# Patient Record
Sex: FEMALE | Race: WHITE | NOT HISPANIC OR LATINO | Employment: FULL TIME | URBAN - METROPOLITAN AREA
[De-identification: names, ages, dates, MRNs, and addresses within clinical notes are randomized per-mention and may not be internally consistent; named-entity substitution may affect disease eponyms.]

---

## 2024-05-07 ENCOUNTER — TELEPHONE (OUTPATIENT)
Dept: HEMATOLOGY ONCOLOGY | Facility: CLINIC | Age: 39
End: 2024-05-07

## 2024-05-07 NOTE — TELEPHONE ENCOUNTER
Patient Call    Who are you speaking with? Zaynab     If it is not the patient, are they listed on an active communication consent form? N/A   What is the reason for this call? Zaynab was calling to make sure that we received the patient's records.     Does this require a call back? N/A   If a call back is required, please list best call back number na   If a call back is required, advise that a message will be forwarded to their care team and someone will return their call as soon as possible.   Did you relay this information to the patient? N/A

## 2024-05-08 ENCOUNTER — CONSULT (OUTPATIENT)
Dept: GYNECOLOGIC ONCOLOGY | Facility: CLINIC | Age: 39
End: 2024-05-08
Payer: COMMERCIAL

## 2024-05-08 ENCOUNTER — TELEPHONE (OUTPATIENT)
Dept: GYNECOLOGIC ONCOLOGY | Facility: CLINIC | Age: 39
End: 2024-05-08

## 2024-05-08 VITALS
DIASTOLIC BLOOD PRESSURE: 74 MMHG | HEART RATE: 90 BPM | OXYGEN SATURATION: 98 % | WEIGHT: 162 LBS | TEMPERATURE: 98.5 F | BODY MASS INDEX: 29.81 KG/M2 | RESPIRATION RATE: 18 BRPM | HEIGHT: 62 IN | SYSTOLIC BLOOD PRESSURE: 124 MMHG

## 2024-05-08 DIAGNOSIS — N80.9 ENDOMETRIOSIS: Primary | ICD-10-CM

## 2024-05-08 DIAGNOSIS — N83.201 BILATERAL OVARIAN CYSTS: ICD-10-CM

## 2024-05-08 DIAGNOSIS — N83.202 BILATERAL OVARIAN CYSTS: ICD-10-CM

## 2024-05-08 PROCEDURE — 99245 OFF/OP CONSLTJ NEW/EST HI 55: CPT | Performed by: OBSTETRICS & GYNECOLOGY

## 2024-05-08 RX ORDER — MELATONIN
1000 DAILY
COMMUNITY

## 2024-05-08 RX ORDER — PHENTERMINE HYDROCHLORIDE 37.5 MG/1
37.5 TABLET ORAL DAILY
COMMUNITY
Start: 2024-03-22

## 2024-05-08 RX ORDER — PENTOSAN POLYSULFATE SODIUM 100 MG/1
100 CAPSULE, GELATIN COATED ORAL
COMMUNITY
Start: 2024-03-18

## 2024-05-08 RX ORDER — OXYCODONE HYDROCHLORIDE AND ACETAMINOPHEN 5; 325 MG/1; MG/1
1 TABLET ORAL
COMMUNITY
Start: 2024-04-28

## 2024-05-08 RX ORDER — ENOXAPARIN SODIUM 300 MG/3ML
40 INJECTION INTRAVENOUS; SUBCUTANEOUS
OUTPATIENT
Start: 2024-05-08 | End: 2024-05-09

## 2024-05-08 RX ORDER — MULTIVITAMIN
1 CAPSULE ORAL DAILY
COMMUNITY

## 2024-05-08 RX ORDER — TOPIRAMATE 25 MG/1
25 TABLET ORAL
COMMUNITY
Start: 2024-03-28

## 2024-05-08 NOTE — TELEPHONE ENCOUNTER
Lmm for patient to return my call, regarding procedure scheduling.  Offered two dates    6/13= Dave, 6/14=Daryl.  Teams number provided ([phone]) for call back.

## 2024-05-08 NOTE — ASSESSMENT & PLAN NOTE
Patient very pleasant 39-year-old female with a recent diagnosis of stage IV endometriosis and bilateral ovarian cysts.  She is being symptomatic for this.  Attempt at laparoscopic surgery was unsuccessful.  We discussed treatment related options.  The patient would like to move ahead with surgery.  She has used oral contraceptives in the past without significant benefit.  We have discussed the likely need for post surgery hormone replacement therapy as we have recommended removal of uterus cervix tubes and ovaries given the significant pathology identified at time of recent surgery.     We have recommended the following:    Robotically assisted total laparoscopic hysterectomy bilateral salpingo-oophorectomy with possible pelvic and para-aortic lymph node dissection staging biopsies including omentectomy based on findings at frozen section..  Have discussed risks and benefits of the procedure including bleeding requiring transfusion infection, infection, damage to local structures including bowel bladder ureter and other local organs.  Additionally cystoscopy with placement of ureteral catheters is recommended to help to minimize risk of damage to the ureters we discussed the risk of deep venous thrombosis.  An open procedure may be required.  All of these complications are in the 2-4% range of likelihood.  The patient understands the risks and benefits of the procedure and has signed an informed consent.  I personally signed the consent form with her.  She does understand that further treatment including chemotherapy radiation therapy or hormones may be required based on the final postoperative pathologic diagnosis and staging.  Standard preoperative testing including type and screen is CBC CPM P chest x-ray and EKG will be ordered.  Any appropriate consultations for preoperative evaluation will also be ordered.  Overall consultation took 60 min with greater than 50% in dedicated toward discussion time.

## 2024-05-08 NOTE — PROGRESS NOTES
Assessment/Plan:    Problem List Items Addressed This Visit       Endometriosis - Primary     Patient very pleasant 39-year-old female with a recent diagnosis of stage IV endometriosis and bilateral ovarian cysts.  She is being symptomatic for this.  Attempt at laparoscopic surgery was unsuccessful.  We discussed treatment related options.  The patient would like to move ahead with surgery.  She has used oral contraceptives in the past without significant benefit.  We have discussed the likely need for post surgery hormone replacement therapy as we have recommended removal of uterus cervix tubes and ovaries given the significant pathology identified at time of recent surgery.     We have recommended the following:    Robotically assisted total laparoscopic hysterectomy bilateral salpingo-oophorectomy with possible pelvic and para-aortic lymph node dissection staging biopsies including omentectomy based on findings at frozen section..  Have discussed risks and benefits of the procedure including bleeding requiring transfusion infection, infection, damage to local structures including bowel bladder ureter and other local organs.  Additionally cystoscopy with placement of ureteral catheters is recommended to help to minimize risk of damage to the ureters we discussed the risk of deep venous thrombosis.  An open procedure may be required.  All of these complications are in the 2-4% range of likelihood.  The patient understands the risks and benefits of the procedure and has signed an informed consent.  I personally signed the consent form with her.  She does understand that further treatment including chemotherapy radiation therapy or hormones may be required based on the final postoperative pathologic diagnosis and staging.  Standard preoperative testing including type and screen is CBC CPM P chest x-ray and EKG will be ordered.  Any appropriate consultations for preoperative evaluation will also be ordered.  Overall  consultation took 60 min with greater than 50% in dedicated toward discussion time.          Relevant Orders    Case request operating room: HYSTERECTOMY LAPAROSCOPIC TOTAL (LTH) W/ ROBOTICS bilateral salpingo-oophorectomy, cystoscopy with bilateral ureteral catheters (Completed)    CBC and differential    Basic metabolic panel    Type and screen    EKG 12 lead    XR chest pa & lateral    Bilateral ovarian cysts    Relevant Orders    Case request operating room: HYSTERECTOMY LAPAROSCOPIC TOTAL (LTH) W/ ROBOTICS bilateral salpingo-oophorectomy, cystoscopy with bilateral ureteral catheters (Completed)    CBC and differential    Basic metabolic panel    Type and screen    EKG 12 lead    XR chest pa & lateral           CHIEF COMPLAINT: Stage IV endometriosis bilateral ovarian cysts        Patient ID: Loli Esquivel is a 39 y.o. female  Patient is a very pleasant 39-year-old female with a history of stage IV endometriosis seen in consultation from Dr. Kofi Daley.    Patient has a history of chronic pelvic pain and endometriosis.  Patient noticed for the past 3 months heavier.  Followed by 2 to 3 weeks of debilitating abdominal pain.  Prior to this the patient had not had any significant pelvic pain.  She     She underwent CAT scan of the abdomen and pelvis and pelvic ultrasound in April 2024.  This revealed a uterus measuring 10 x 7 x 6.5 cm.  Right ovary measured 8 x 7 x 5.6 cm with a large complex cyst with low-level echoes consistent with an endometrioma.  The left ovary measured 6 x 7 x 5.2 cm with a similar echo appearance also consistent with endometriosis.  Patient underwent diagnostic laparoscopy, cystourethroscopy drainage of large right endometrioma and intraoperative hemorrhage requiring extensive tamponade it was elected not to proceed with procedure.  Estimated blood loss was approximately 400 to 500 cc.  Findings were noted for stage IV endometriosis with obliteration of the cul-de-sac.    Patient  presents for further surgical management.    Today, the patient is doing well.  She denies significant abdominal pain, pelvic pain, nausea, vomiting, constipation, diarrhea, fevers, chills, or vaginal bleeding.  Incisions have been healing well.  The umbilical incision has been having minimal clear drainage           Review of Systems   Constitutional: Negative.    HENT: Negative.     Eyes: Negative.    Respiratory: Negative.     Cardiovascular: Negative.    Gastrointestinal: Negative.    Endocrine: Negative.    Genitourinary: Negative.    Musculoskeletal: Negative.    Skin: Negative.    Neurological: Negative.    Hematological: Negative.    Psychiatric/Behavioral: Negative.         Current Outpatient Medications   Medication Sig Dispense Refill    Elmiron 100 MG capsule Take 100 mg by mouth 3 (three) times a day before meals      cholecalciferol (VITAMIN D3) 1,000 units tablet Take 1,000 Units by mouth daily (Patient not taking: Reported on 2024)      Multiple Vitamin (multivitamin) capsule Take 1 capsule by mouth daily (Patient not taking: Reported on 2024)      oxyCODONE-acetaminophen (PERCOCET) 5-325 mg per tablet Take 1 tablet by mouth every 3 (three) hours as needed (Patient not taking: Reported on 2024)      phentermine (ADIPEX-P) 37.5 MG tablet Take 37.5 mg by mouth daily (Patient not taking: Reported on 2024)      topiramate (TOPAMAX) 25 mg tablet Take 25 mg by mouth daily at bedtime (Patient not taking: Reported on 2024)       No current facility-administered medications for this visit.       No Known Allergies    Past Medical History:   Diagnosis Date    Chronic pain in female pelvis     Dysmenorrhea     Endometriosis     Interstitial cystitis     Other ovarian cyst, left side     Pre-eclampsia     Rhabdomyolysis        Past Surgical History:   Procedure Laterality Date    DILATION AND CURETTAGE OF UTERUS  2007    MOUTH SURGERY         OB History          4    Para   3    Term  "  0       1    AB   1    Living   3         SAB   1    IAB        Ectopic        Multiple        Live Births   3                 Family History   Problem Relation Age of Onset    No Known Problems Mother     Heart attack Father     Other Brother         Sepsis       The following portions of the patient's history were reviewed and updated as appropriate: allergies, current medications, past family history, past medical history, past social history, past surgical history, and problem list.      Objective:    Blood pressure 124/74, pulse 90, temperature 98.5 °F (36.9 °C), temperature source Tympanic, resp. rate 18, height 5' 2\" (1.575 m), weight 73.5 kg (162 lb), last menstrual period 2024, SpO2 98%.  Body mass index is 29.63 kg/m².    Physical Exam  Constitutional:       Appearance: She is well-developed.   HENT:      Head: Normocephalic and atraumatic.   Eyes:      Pupils: Pupils are equal, round, and reactive to light.   Cardiovascular:      Rate and Rhythm: Normal rate and regular rhythm.      Heart sounds: Normal heart sounds.   Pulmonary:      Effort: Pulmonary effort is normal. No respiratory distress.      Breath sounds: Normal breath sounds.   Abdominal:      General: Bowel sounds are normal. There is no distension.      Palpations: Abdomen is soft. Abdomen is not rigid.      Tenderness: There is no abdominal tenderness. There is no guarding or rebound.   Genitourinary:     Comments: -Normal external female genitalia, normal Bartholin's and Shafter's glands                  -Normal midline urethral meatus. No lesions notes                  -Bladder without fullness mass or tenderness                  -Vagina without lesion or discharge No significant cystocele or rectocele noted                  -Cervix normal appearing without visible lesions                  -Uterus with normal contour, mobility. No tenderness,                  -Adnexae with enlarged nontender cystic structure in the cul-de-sac " measuring approximately 8 x 8 cm.  No sidewall encroachment bilaterally                  - Anus without fissure of lesion    Musculoskeletal:         General: Normal range of motion.      Cervical back: Normal range of motion and neck supple.   Lymphadenopathy:      Cervical: No cervical adenopathy.      Upper Body:      Right upper body: No supraclavicular adenopathy.      Left upper body: No supraclavicular adenopathy.   Skin:     General: Skin is warm and dry.   Neurological:      Mental Status: She is alert and oriented to person, place, and time.   Psychiatric:         Behavior: Behavior normal.

## 2024-05-08 NOTE — TELEPHONE ENCOUNTER
Spoke with patient procedure scheduled for 6/14/24 [facility].  Pre-op Surgical information reviewed with patient, all questions/concerns addressed.  Pre-op required testing reviewed with patient, all questions/concerns addressed.  Post op appointment scheduled.

## 2024-05-16 ENCOUNTER — TELEPHONE (OUTPATIENT)
Dept: GYNECOLOGIC ONCOLOGY | Facility: CLINIC | Age: 39
End: 2024-05-16

## 2024-05-16 ENCOUNTER — TELEPHONE (OUTPATIENT)
Dept: HEMATOLOGY ONCOLOGY | Facility: CLINIC | Age: 39
End: 2024-05-16

## 2024-05-16 NOTE — TELEPHONE ENCOUNTER
Called to inform patient that we can not receive [facility] paperwork via email due to it not being HIPAA compliant. I instructed patient she can send the documents via ioBridge message or fax. Patient is going to send it via ioBridge message. Patient will call the office back if she has any troubles.

## 2024-05-16 NOTE — TELEPHONE ENCOUNTER
Patient Call    Who are you speaking with? Patient    If it is not the patient, are they listed on an active communication consent form? N/A   What is the reason for this call? Pt is calling in regards to her FMLA forms she has for her upcoming procedure with Dr Moore. Pt would like to email the forms since she lives and hour away from the office. Please call pt back if this is a possibility.    Does this require a call back? Yes   If a call back is required, please list best call back number [phone]   If a call back is required, advise that a message will be forwarded to their care team and someone will return their call as soon as possible.   Did you relay this information to the patient? Yes

## 2024-06-03 RX ORDER — ACETAMINOPHEN 325 MG/1
650 TABLET ORAL EVERY 6 HOURS PRN
COMMUNITY

## 2024-06-03 NOTE — PRE-PROCEDURE INSTRUCTIONS
Pre-Surgery Instructions:   Medication Instructions    acetaminophen (TYLENOL) 325 mg tablet Uses PRN- OK to take day of surgery    cholecalciferol (VITAMIN D3) 1,000 units tablet Pt currently holding, understands to hold 7 days before sx.     Elmiron 100 MG capsule Pt currently holding, intends to take no meds morning of sx    Multiple Vitamin (multivitamin) capsule Inst to hold 7 days before sx, states is already holding    phentermine (ADIPEX-P) 37.5 MG tablet Pt currently holding, understands is 5 day hold before sx    topiramate (TOPAMAX) 25 mg tablet Pt currently holding, intends to take no meds morning of sx    Medication instructions for day surgery reviewed. Please use only a sip of water to take your instructed medications. Avoid aspirin and all over the counter vitamins, supplements and NSAIDS for one week prior to surgery per anesthesia guidelines. Tylenol is ok to take as needed.     You will receive a call one business day prior to surgery with an arrival time and hospital directions. If your surgery is scheduled on a Monday, the hospital will be calling you on the Friday prior to your surgery. If you have not heard from anyone by 8pm, please call the hospital supervisor through the hospital  at [phone]. ([place] [phone] or [place] [phone]).    Do not eat or drink anything after midnight the night before your surgery, including candy, mints, lifesavers, or chewing gum. Do not drink alcohol 24hrs before your surgery. Try not to smoke at least 24hrs before your surgery.   Inst no vaping 24 hrs before sx    Carb Drink Process reviewed w/ phone call , pt states has 3 drinks from office w/ instructions, has no questions after reviewing process .    Follow the pre surgery showering instructions as listed in the “My Surgical Experience Booklet” or otherwise provided by your surgeon's office. Do not use a blade to shave the surgical area 1 week before surgery. It is okay to use a clean  electric clippers up to 24 hours before surgery. Do not apply any lotions, creams, including makeup, cologne, deodorant, or perfumes after showering on the day of your surgery. Do not use dry shampoo, hair spray, hair gel, or any type of hair products.     No contact lenses, eye make-up, or artificial eyelashes. Remove nail polish, including gel polish, and any artificial, gel, or acrylic nails if possible. Remove all jewelry including rings and body piercing jewelry.     Wear causal clothing that is easy to take on and off. Consider your type of surgery.    Keep any valuables, jewelry, piercings at home. Please bring any specially ordered equipment (sling, braces) if indicated.    Arrange for a responsible person to drive you to and from the hospital on the day of your surgery. Please confirm the visitor policy for the day of your procedure when you receive your phone call with an arrival time.     Call the surgeon's office with any new illnesses, exposures, or additional questions prior to surgery.    Please reference your “My Surgical Experience Booklet” for additional information to prepare for your upcoming surgery.

## 2024-06-04 ENCOUNTER — OFFICE VISIT (OUTPATIENT)
Dept: LAB | Facility: HOSPITAL | Age: 39
End: 2024-06-04
Payer: COMMERCIAL

## 2024-06-04 ENCOUNTER — HOSPITAL ENCOUNTER (OUTPATIENT)
Dept: RADIOLOGY | Facility: HOSPITAL | Age: 39
Discharge: HOME | End: 2024-06-04
Payer: COMMERCIAL

## 2024-06-04 ENCOUNTER — APPOINTMENT (OUTPATIENT)
Dept: LAB | Facility: HOSPITAL | Age: 39
End: 2024-06-04
Attending: OBSTETRICS & GYNECOLOGY
Payer: COMMERCIAL

## 2024-06-04 ENCOUNTER — LAB REQUISITION (OUTPATIENT)
Dept: LAB | Facility: HOSPITAL | Age: 39
End: 2024-06-04
Payer: COMMERCIAL

## 2024-06-04 DIAGNOSIS — Z01.818 PRE-OP EVALUATION: ICD-10-CM

## 2024-06-04 DIAGNOSIS — Z01.818 ENCOUNTER FOR OTHER PREPROCEDURAL EXAMINATION: ICD-10-CM

## 2024-06-04 DIAGNOSIS — N83.202 BILATERAL OVARIAN CYSTS: ICD-10-CM

## 2024-06-04 DIAGNOSIS — N83.201 BILATERAL OVARIAN CYSTS: ICD-10-CM

## 2024-06-04 DIAGNOSIS — N80.9 ENDOMETRIOSIS: ICD-10-CM

## 2024-06-04 LAB
ABO GROUP BLD: NORMAL
ANION GAP SERPL CALCULATED.3IONS-SCNC: 6 MMOL/L (ref 4–13)
ATRIAL RATE: 75 BPM
BASOPHILS # BLD AUTO: 0.06 THOUSANDS/ÂΜL (ref 0–0.1)
BASOPHILS NFR BLD AUTO: 1 % (ref 0–1)
BLD GP AB SCN SERPL QL: NEGATIVE
BUN SERPL-MCNC: 10 MG/DL (ref 5–25)
CALCIUM SERPL-MCNC: 8.7 MG/DL (ref 8.4–10.2)
CHLORIDE SERPL-SCNC: 106 MMOL/L (ref 96–108)
CO2 SERPL-SCNC: 26 MMOL/L (ref 21–32)
CREAT SERPL-MCNC: 0.62 MG/DL (ref 0.6–1.3)
EOSINOPHIL # BLD AUTO: 0.13 THOUSAND/ÂΜL (ref 0–0.61)
EOSINOPHIL NFR BLD AUTO: 2 % (ref 0–6)
ERYTHROCYTE [DISTWIDTH] IN BLOOD BY AUTOMATED COUNT: 12.9 % (ref 11.6–15.1)
GFR SERPL CREATININE-BSD FRML MDRD: 113 ML/MIN/1.73SQ M
GLUCOSE P FAST SERPL-MCNC: 101 MG/DL (ref 65–99)
HCT VFR BLD AUTO: 33.2 % (ref 34.8–46.1)
HGB BLD-MCNC: 10.5 G/DL (ref 11.5–15.4)
IMM GRANULOCYTES # BLD AUTO: 0.01 THOUSAND/UL (ref 0–0.2)
IMM GRANULOCYTES NFR BLD AUTO: 0 % (ref 0–2)
LYMPHOCYTES # BLD AUTO: 1.18 THOUSANDS/ÂΜL (ref 0.6–4.47)
LYMPHOCYTES NFR BLD AUTO: 18 % (ref 14–44)
MCH RBC QN AUTO: 26.1 PG (ref 26.8–34.3)
MCHC RBC AUTO-ENTMCNC: 31.6 G/DL (ref 31.4–37.4)
MCV RBC AUTO: 82 FL (ref 82–98)
MONOCYTES # BLD AUTO: 0.67 THOUSAND/ÂΜL (ref 0.17–1.22)
MONOCYTES NFR BLD AUTO: 10 % (ref 4–12)
NEUTROPHILS # BLD AUTO: 4.59 THOUSANDS/ÂΜL (ref 1.85–7.62)
NEUTS SEG NFR BLD AUTO: 69 % (ref 43–75)
NRBC BLD AUTO-RTO: 0 /100 WBCS
P AXIS: 34 DEGREES
PLATELET # BLD AUTO: 246 THOUSANDS/UL (ref 149–390)
PMV BLD AUTO: 10 FL (ref 8.9–12.7)
POTASSIUM SERPL-SCNC: 3.9 MMOL/L (ref 3.5–5.3)
PR INTERVAL: 134 MS
QRS AXIS: 23 DEGREES
QRSD INTERVAL: 84 MS
QT INTERVAL: 380 MS
QTC INTERVAL: 424 MS
RBC # BLD AUTO: 4.03 MILLION/UL (ref 3.81–5.12)
RH BLD: POSITIVE
SODIUM SERPL-SCNC: 138 MMOL/L (ref 135–147)
SPECIMEN EXPIRATION DATE: NORMAL
T WAVE AXIS: 28 DEGREES
VENTRICULAR RATE: 75 BPM
WBC # BLD AUTO: 6.64 THOUSAND/UL (ref 4.31–10.16)

## 2024-06-04 PROCEDURE — 93010 ELECTROCARDIOGRAM REPORT: CPT | Performed by: INTERNAL MEDICINE

## 2024-06-04 PROCEDURE — 86900 BLOOD TYPING SEROLOGIC ABO: CPT | Performed by: OBSTETRICS & GYNECOLOGY

## 2024-06-04 PROCEDURE — 36415 COLL VENOUS BLD VENIPUNCTURE: CPT

## 2024-06-04 PROCEDURE — 71046 X-RAY EXAM CHEST 2 VIEWS: CPT

## 2024-06-04 PROCEDURE — 80048 BASIC METABOLIC PNL TOTAL CA: CPT

## 2024-06-04 PROCEDURE — 93005 ELECTROCARDIOGRAM TRACING: CPT

## 2024-06-04 PROCEDURE — 85025 COMPLETE CBC W/AUTO DIFF WBC: CPT

## 2024-06-04 PROCEDURE — 86850 RBC ANTIBODY SCREEN: CPT | Performed by: OBSTETRICS & GYNECOLOGY

## 2024-06-04 PROCEDURE — 86901 BLOOD TYPING SEROLOGIC RH(D): CPT | Performed by: OBSTETRICS & GYNECOLOGY

## 2024-06-13 ENCOUNTER — ANESTHESIA (OUTPATIENT)
Dept: PERIOP | Facility: HOSPITAL | Age: 39
End: 2024-06-13
Payer: COMMERCIAL

## 2024-06-13 ENCOUNTER — HOSPITAL ENCOUNTER (OUTPATIENT)
Facility: HOSPITAL | Age: 39
Setting detail: OUTPATIENT SURGERY
Discharge: HOME | End: 2024-06-14
Attending: OBSTETRICS & GYNECOLOGY | Admitting: OBSTETRICS & GYNECOLOGY
Payer: COMMERCIAL

## 2024-06-13 ENCOUNTER — ANESTHESIA EVENT (OUTPATIENT)
Dept: PERIOP | Facility: HOSPITAL | Age: 39
End: 2024-06-13
Payer: COMMERCIAL

## 2024-06-13 DIAGNOSIS — N83.201 BILATERAL OVARIAN CYSTS: ICD-10-CM

## 2024-06-13 DIAGNOSIS — G89.18 POSTOPERATIVE PAIN: Primary | ICD-10-CM

## 2024-06-13 DIAGNOSIS — N83.202 BILATERAL OVARIAN CYSTS: ICD-10-CM

## 2024-06-13 DIAGNOSIS — N80.9 ENDOMETRIOSIS: ICD-10-CM

## 2024-06-13 LAB
ABO GROUP BLD: NORMAL
ERYTHROCYTE [DISTWIDTH] IN BLOOD BY AUTOMATED COUNT: 13.5 % (ref 11.6–15.1)
EXT PREGNANCY TEST URINE: NEGATIVE
EXT. CONTROL: NORMAL
GLUCOSE SERPL-MCNC: 96 MG/DL (ref 65–140)
HCT VFR BLD AUTO: 33.4 % (ref 34.8–46.1)
HGB BLD-MCNC: 10.2 G/DL (ref 11.5–15.4)
MCH RBC QN AUTO: 25.9 PG (ref 26.8–34.3)
MCHC RBC AUTO-ENTMCNC: 30.5 G/DL (ref 31.4–37.4)
MCV RBC AUTO: 85 FL (ref 82–98)
PLATELET # BLD AUTO: 288 THOUSANDS/UL (ref 149–390)
PMV BLD AUTO: 10 FL (ref 8.9–12.7)
RBC # BLD AUTO: 3.94 MILLION/UL (ref 3.81–5.12)
RH BLD: POSITIVE
WBC # BLD AUTO: 11.66 THOUSAND/UL (ref 4.31–10.16)

## 2024-06-13 PROCEDURE — 88305 TISSUE EXAM BY PATHOLOGIST: CPT | Performed by: PATHOLOGY

## 2024-06-13 PROCEDURE — 88331 PATH CONSLTJ SURG 1 BLK 1SPC: CPT | Performed by: PATHOLOGY

## 2024-06-13 PROCEDURE — S2900 ROBOTIC SURGICAL SYSTEM: HCPCS | Performed by: OBSTETRICS & GYNECOLOGY

## 2024-06-13 PROCEDURE — NC001 PR NO CHARGE: Performed by: OBSTETRICS & GYNECOLOGY

## 2024-06-13 PROCEDURE — 58573 TLH W/T/O UTERUS OVER 250 G: CPT | Performed by: OBSTETRICS & GYNECOLOGY

## 2024-06-13 PROCEDURE — 81025 URINE PREGNANCY TEST: CPT | Performed by: OBSTETRICS & GYNECOLOGY

## 2024-06-13 PROCEDURE — 85027 COMPLETE CBC AUTOMATED: CPT | Performed by: OBSTETRICS & GYNECOLOGY

## 2024-06-13 PROCEDURE — NC001 PR NO CHARGE: Performed by: PHYSICIAN ASSISTANT

## 2024-06-13 PROCEDURE — 82948 REAGENT STRIP/BLOOD GLUCOSE: CPT

## 2024-06-13 PROCEDURE — 88307 TISSUE EXAM BY PATHOLOGIST: CPT | Performed by: PATHOLOGY

## 2024-06-13 RX ORDER — HYDROMORPHONE HCL IN WATER/PF 6 MG/30 ML
0.2 PATIENT CONTROLLED ANALGESIA SYRINGE INTRAVENOUS
Status: DISCONTINUED | OUTPATIENT
Start: 2024-06-13 | End: 2024-06-13 | Stop reason: HOSPADM

## 2024-06-13 RX ORDER — ONDANSETRON 2 MG/ML
INJECTION INTRAMUSCULAR; INTRAVENOUS AS NEEDED
Status: DISCONTINUED | OUTPATIENT
Start: 2024-06-13 | End: 2024-06-13

## 2024-06-13 RX ORDER — PROPOFOL 10 MG/ML
INJECTION, EMULSION INTRAVENOUS AS NEEDED
Status: DISCONTINUED | OUTPATIENT
Start: 2024-06-13 | End: 2024-06-13

## 2024-06-13 RX ORDER — PROMETHAZINE HYDROCHLORIDE 25 MG/ML
12.5 INJECTION, SOLUTION INTRAMUSCULAR; INTRAVENOUS EVERY 6 HOURS PRN
Status: DISCONTINUED | OUTPATIENT
Start: 2024-06-13 | End: 2024-06-14 | Stop reason: HOSPADM

## 2024-06-13 RX ORDER — HYDROMORPHONE HCL/PF 1 MG/ML
0.5 SYRINGE (ML) INJECTION
Status: DISCONTINUED | OUTPATIENT
Start: 2024-06-13 | End: 2024-06-13 | Stop reason: HOSPADM

## 2024-06-13 RX ORDER — SODIUM CHLORIDE 9 MG/ML
INJECTION, SOLUTION INTRAVENOUS AS NEEDED
Status: DISCONTINUED | OUTPATIENT
Start: 2024-06-13 | End: 2024-06-13 | Stop reason: HOSPADM

## 2024-06-13 RX ORDER — SODIUM CHLORIDE 9 MG/ML
INJECTION, SOLUTION INTRAVENOUS CONTINUOUS PRN
Status: DISCONTINUED | OUTPATIENT
Start: 2024-06-13 | End: 2024-06-13

## 2024-06-13 RX ORDER — ALBUMIN, HUMAN INJ 5% 5 %
SOLUTION INTRAVENOUS CONTINUOUS PRN
Status: DISCONTINUED | OUTPATIENT
Start: 2024-06-13 | End: 2024-06-13

## 2024-06-13 RX ORDER — HYDROMORPHONE HCL IN WATER/PF 6 MG/30 ML
0.2 PATIENT CONTROLLED ANALGESIA SYRINGE INTRAVENOUS EVERY 4 HOURS PRN
Status: DISCONTINUED | OUTPATIENT
Start: 2024-06-13 | End: 2024-06-14 | Stop reason: HOSPADM

## 2024-06-13 RX ORDER — OXYCODONE HYDROCHLORIDE 5 MG/1
TABLET ORAL
Qty: 7 TABLET | Refills: 0 | Status: SHIPPED | OUTPATIENT
Start: 2024-06-13

## 2024-06-13 RX ORDER — OXYCODONE HYDROCHLORIDE 5 MG/1
5 TABLET ORAL EVERY 4 HOURS PRN
Status: DISCONTINUED | OUTPATIENT
Start: 2024-06-13 | End: 2024-06-13

## 2024-06-13 RX ORDER — ONDANSETRON 2 MG/ML
4 INJECTION INTRAMUSCULAR; INTRAVENOUS EVERY 6 HOURS PRN
Status: DISCONTINUED | OUTPATIENT
Start: 2024-06-13 | End: 2024-06-14 | Stop reason: HOSPADM

## 2024-06-13 RX ORDER — SODIUM CHLORIDE, SODIUM LACTATE, POTASSIUM CHLORIDE, CALCIUM CHLORIDE 600; 310; 30; 20 MG/100ML; MG/100ML; MG/100ML; MG/100ML
INJECTION, SOLUTION INTRAVENOUS CONTINUOUS PRN
Status: DISCONTINUED | OUTPATIENT
Start: 2024-06-13 | End: 2024-06-13

## 2024-06-13 RX ORDER — DEXAMETHASONE SODIUM PHOSPHATE 10 MG/ML
INJECTION, SOLUTION INTRAMUSCULAR; INTRAVENOUS AS NEEDED
Status: DISCONTINUED | OUTPATIENT
Start: 2024-06-13 | End: 2024-06-13

## 2024-06-13 RX ORDER — OXYCODONE HYDROCHLORIDE 5 MG/1
5 TABLET ORAL EVERY 4 HOURS PRN
Status: DISCONTINUED | OUTPATIENT
Start: 2024-06-13 | End: 2024-06-14 | Stop reason: HOSPADM

## 2024-06-13 RX ORDER — METRONIDAZOLE 500 MG/100ML
500 INJECTION, SOLUTION INTRAVENOUS ONCE
Status: DISCONTINUED | OUTPATIENT
Start: 2024-06-13 | End: 2024-06-13 | Stop reason: HOSPADM

## 2024-06-13 RX ORDER — PROPOFOL 10 MG/ML
INJECTION, EMULSION INTRAVENOUS CONTINUOUS PRN
Status: DISCONTINUED | OUTPATIENT
Start: 2024-06-13 | End: 2024-06-13

## 2024-06-13 RX ORDER — ACETAMINOPHEN 10 MG/ML
1000 INJECTION, SOLUTION INTRAVENOUS ONCE
Status: COMPLETED | OUTPATIENT
Start: 2024-06-13 | End: 2024-06-13

## 2024-06-13 RX ORDER — FENTANYL CITRATE/PF 50 MCG/ML
50 SYRINGE (ML) INJECTION
Status: DISCONTINUED | OUTPATIENT
Start: 2024-06-13 | End: 2024-06-13 | Stop reason: HOSPADM

## 2024-06-13 RX ORDER — ENOXAPARIN SODIUM 100 MG/ML
40 INJECTION SUBCUTANEOUS
Status: COMPLETED | OUTPATIENT
Start: 2024-06-13 | End: 2024-06-13

## 2024-06-13 RX ORDER — IBUPROFEN 600 MG/1
600 TABLET ORAL EVERY 6 HOURS PRN
Qty: 56 TABLET | Refills: 0 | Status: SHIPPED | OUTPATIENT
Start: 2024-06-13 | End: 2024-06-27

## 2024-06-13 RX ORDER — ROCURONIUM BROMIDE 10 MG/ML
INJECTION, SOLUTION INTRAVENOUS AS NEEDED
Status: DISCONTINUED | OUTPATIENT
Start: 2024-06-13 | End: 2024-06-13

## 2024-06-13 RX ORDER — BUPIVACAINE HYDROCHLORIDE 5 MG/ML
INJECTION, SOLUTION EPIDURAL; INTRACAUDAL AS NEEDED
Status: DISCONTINUED | OUTPATIENT
Start: 2024-06-13 | End: 2024-06-13 | Stop reason: HOSPADM

## 2024-06-13 RX ORDER — CEFAZOLIN SODIUM 1 G/50ML
1000 SOLUTION INTRAVENOUS ONCE
Status: COMPLETED | OUTPATIENT
Start: 2024-06-13 | End: 2024-06-13

## 2024-06-13 RX ORDER — KETOROLAC TROMETHAMINE 30 MG/ML
15 INJECTION, SOLUTION INTRAMUSCULAR; INTRAVENOUS EVERY 6 HOURS SCHEDULED
Status: COMPLETED | OUTPATIENT
Start: 2024-06-13 | End: 2024-06-14

## 2024-06-13 RX ORDER — MIDAZOLAM HYDROCHLORIDE 2 MG/2ML
INJECTION, SOLUTION INTRAMUSCULAR; INTRAVENOUS AS NEEDED
Status: DISCONTINUED | OUTPATIENT
Start: 2024-06-13 | End: 2024-06-13

## 2024-06-13 RX ORDER — HYDROMORPHONE HCL/PF 1 MG/ML
SYRINGE (ML) INJECTION AS NEEDED
Status: DISCONTINUED | OUTPATIENT
Start: 2024-06-13 | End: 2024-06-13

## 2024-06-13 RX ORDER — ACETAMINOPHEN 325 MG/1
975 TABLET ORAL EVERY 6 HOURS PRN
Status: DISCONTINUED | OUTPATIENT
Start: 2024-06-13 | End: 2024-06-13

## 2024-06-13 RX ORDER — METRONIDAZOLE 500 MG/100ML
INJECTION, SOLUTION INTRAVENOUS CONTINUOUS PRN
Status: DISCONTINUED | OUTPATIENT
Start: 2024-06-13 | End: 2024-06-13

## 2024-06-13 RX ORDER — ONDANSETRON 2 MG/ML
4 INJECTION INTRAMUSCULAR; INTRAVENOUS ONCE AS NEEDED
Status: DISCONTINUED | OUTPATIENT
Start: 2024-06-13 | End: 2024-06-13 | Stop reason: HOSPADM

## 2024-06-13 RX ORDER — SODIUM CHLORIDE, SODIUM LACTATE, POTASSIUM CHLORIDE, CALCIUM CHLORIDE 600; 310; 30; 20 MG/100ML; MG/100ML; MG/100ML; MG/100ML
100 INJECTION, SOLUTION INTRAVENOUS CONTINUOUS
Status: DISCONTINUED | OUTPATIENT
Start: 2024-06-13 | End: 2024-06-13

## 2024-06-13 RX ORDER — OXYCODONE HYDROCHLORIDE 10 MG/1
10 TABLET ORAL EVERY 4 HOURS PRN
Status: DISCONTINUED | OUTPATIENT
Start: 2024-06-13 | End: 2024-06-14 | Stop reason: HOSPADM

## 2024-06-13 RX ORDER — IBUPROFEN 600 MG/1
600 TABLET ORAL EVERY 6 HOURS SCHEDULED
Status: DISCONTINUED | OUTPATIENT
Start: 2024-06-14 | End: 2024-06-14 | Stop reason: HOSPADM

## 2024-06-13 RX ORDER — FENTANYL CITRATE 50 UG/ML
INJECTION, SOLUTION INTRAMUSCULAR; INTRAVENOUS AS NEEDED
Status: DISCONTINUED | OUTPATIENT
Start: 2024-06-13 | End: 2024-06-13

## 2024-06-13 RX ORDER — KETOROLAC TROMETHAMINE 30 MG/ML
15 INJECTION, SOLUTION INTRAMUSCULAR; INTRAVENOUS ONCE
Status: COMPLETED | OUTPATIENT
Start: 2024-06-13 | End: 2024-06-13

## 2024-06-13 RX ORDER — ACETAMINOPHEN 10 MG/ML
1000 INJECTION, SOLUTION INTRAVENOUS EVERY 8 HOURS
Status: COMPLETED | OUTPATIENT
Start: 2024-06-13 | End: 2024-06-14

## 2024-06-13 RX ORDER — ONDANSETRON 2 MG/ML
4 INJECTION INTRAMUSCULAR; INTRAVENOUS EVERY 6 HOURS PRN
Status: DISCONTINUED | OUTPATIENT
Start: 2024-06-13 | End: 2024-06-13

## 2024-06-13 RX ORDER — SODIUM CHLORIDE 9 MG/ML
100 INJECTION, SOLUTION INTRAVENOUS CONTINUOUS
Status: DISPENSED | OUTPATIENT
Start: 2024-06-13 | End: 2024-06-14

## 2024-06-13 RX ORDER — ACETAMINOPHEN 325 MG/1
975 TABLET ORAL EVERY 8 HOURS SCHEDULED
Status: DISCONTINUED | OUTPATIENT
Start: 2024-06-14 | End: 2024-06-14 | Stop reason: HOSPADM

## 2024-06-13 RX ORDER — LIDOCAINE HYDROCHLORIDE 10 MG/ML
INJECTION, SOLUTION EPIDURAL; INFILTRATION; INTRACAUDAL; PERINEURAL AS NEEDED
Status: DISCONTINUED | OUTPATIENT
Start: 2024-06-13 | End: 2024-06-13

## 2024-06-13 RX ORDER — DOCUSATE SODIUM 100 MG/1
100 CAPSULE, LIQUID FILLED ORAL 2 TIMES DAILY
Status: DISCONTINUED | OUTPATIENT
Start: 2024-06-13 | End: 2024-06-14 | Stop reason: HOSPADM

## 2024-06-13 RX ORDER — CALCIUM CARBONATE 500 MG/1
1000 TABLET, CHEWABLE ORAL DAILY PRN
Status: DISCONTINUED | OUTPATIENT
Start: 2024-06-13 | End: 2024-06-14 | Stop reason: HOSPADM

## 2024-06-13 RX ADMIN — KETOROLAC TROMETHAMINE 15 MG: 30 INJECTION, SOLUTION INTRAMUSCULAR; INTRAVENOUS at 12:22

## 2024-06-13 RX ADMIN — FENTANYL CITRATE 50 MCG: 50 INJECTION INTRAMUSCULAR; INTRAVENOUS at 10:56

## 2024-06-13 RX ADMIN — FENTANYL CITRATE 50 MCG: 50 INJECTION INTRAMUSCULAR; INTRAVENOUS at 08:59

## 2024-06-13 RX ADMIN — SUGAMMADEX 200 MG: 100 INJECTION, SOLUTION INTRAVENOUS at 11:13

## 2024-06-13 RX ADMIN — HYDROMORPHONE HYDROCHLORIDE 0.2 MG: 0.2 INJECTION, SOLUTION INTRAMUSCULAR; INTRAVENOUS; SUBCUTANEOUS at 12:45

## 2024-06-13 RX ADMIN — ROCURONIUM BROMIDE 10 MG: 10 INJECTION, SOLUTION INTRAVENOUS at 08:14

## 2024-06-13 RX ADMIN — DEXMEDETOMIDINE 4 MCG: 100 INJECTION, SOLUTION INTRAVENOUS at 10:57

## 2024-06-13 RX ADMIN — METRONIDAZOLE: 500 INJECTION, SOLUTION INTRAVENOUS at 07:55

## 2024-06-13 RX ADMIN — OXYCODONE HYDROCHLORIDE 5 MG: 5 TABLET ORAL at 18:38

## 2024-06-13 RX ADMIN — CEFAZOLIN SODIUM 1000 MG: 1 SOLUTION INTRAVENOUS at 07:48

## 2024-06-13 RX ADMIN — FENTANYL CITRATE 50 MCG: 50 INJECTION INTRAMUSCULAR; INTRAVENOUS at 11:44

## 2024-06-13 RX ADMIN — DOCUSATE SODIUM 100 MG: 100 CAPSULE, LIQUID FILLED ORAL at 18:38

## 2024-06-13 RX ADMIN — SODIUM CHLORIDE 100 ML/HR: 0.9 INJECTION, SOLUTION INTRAVENOUS at 18:39

## 2024-06-13 RX ADMIN — ACETAMINOPHEN 1000 MG: 10 INJECTION INTRAVENOUS at 19:31

## 2024-06-13 RX ADMIN — PROMETHAZINE HYDROCHLORIDE 12.5 MG: 25 INJECTION INTRAMUSCULAR; INTRAVENOUS at 14:23

## 2024-06-13 RX ADMIN — LIDOCAINE HYDROCHLORIDE 100 MG: 10 INJECTION, SOLUTION EPIDURAL; INFILTRATION; INTRACAUDAL; PERINEURAL at 07:41

## 2024-06-13 RX ADMIN — KETOROLAC TROMETHAMINE 15 MG: 30 INJECTION, SOLUTION INTRAMUSCULAR; INTRAVENOUS at 21:03

## 2024-06-13 RX ADMIN — SODIUM CHLORIDE, SODIUM LACTATE, POTASSIUM CHLORIDE, AND CALCIUM CHLORIDE: .6; .31; .03; .02 INJECTION, SOLUTION INTRAVENOUS at 07:36

## 2024-06-13 RX ADMIN — HYDROMORPHONE HYDROCHLORIDE 0.2 MG: 0.2 INJECTION, SOLUTION INTRAMUSCULAR; INTRAVENOUS; SUBCUTANEOUS at 12:10

## 2024-06-13 RX ADMIN — ROCURONIUM BROMIDE 10 MG: 10 INJECTION, SOLUTION INTRAVENOUS at 08:54

## 2024-06-13 RX ADMIN — ENOXAPARIN SODIUM 40 MG: 40 INJECTION SUBCUTANEOUS at 06:56

## 2024-06-13 RX ADMIN — ROCURONIUM BROMIDE 10 MG: 10 INJECTION, SOLUTION INTRAVENOUS at 10:27

## 2024-06-13 RX ADMIN — FENTANYL CITRATE 50 MCG: 50 INJECTION INTRAMUSCULAR; INTRAVENOUS at 07:41

## 2024-06-13 RX ADMIN — SODIUM CHLORIDE: 0.9 INJECTION, SOLUTION INTRAVENOUS at 07:45

## 2024-06-13 RX ADMIN — FENTANYL CITRATE 50 MCG: 50 INJECTION INTRAMUSCULAR; INTRAVENOUS at 11:53

## 2024-06-13 RX ADMIN — HYDROMORPHONE HYDROCHLORIDE 0.5 MG: 1 INJECTION, SOLUTION INTRAMUSCULAR; INTRAVENOUS; SUBCUTANEOUS at 14:02

## 2024-06-13 RX ADMIN — ACETAMINOPHEN 1000 MG: 10 INJECTION INTRAVENOUS at 12:21

## 2024-06-13 RX ADMIN — DEXMEDETOMIDINE 4 MCG: 100 INJECTION, SOLUTION INTRAVENOUS at 09:13

## 2024-06-13 RX ADMIN — FENTANYL CITRATE 50 MCG: 50 INJECTION INTRAMUSCULAR; INTRAVENOUS at 08:15

## 2024-06-13 RX ADMIN — ALBUMIN (HUMAN): 12.5 INJECTION, SOLUTION INTRAVENOUS at 10:03

## 2024-06-13 RX ADMIN — ONDANSETRON 4 MG: 2 INJECTION INTRAMUSCULAR; INTRAVENOUS at 07:41

## 2024-06-13 RX ADMIN — DEXAMETHASONE SODIUM PHOSPHATE 10 MG: 10 INJECTION, SOLUTION INTRAMUSCULAR; INTRAVENOUS at 07:41

## 2024-06-13 RX ADMIN — HYDROMORPHONE HYDROCHLORIDE 0.5 MG: 1 INJECTION, SOLUTION INTRAMUSCULAR; INTRAVENOUS; SUBCUTANEOUS at 08:06

## 2024-06-13 RX ADMIN — MIDAZOLAM 2 MG: 1 INJECTION INTRAMUSCULAR; INTRAVENOUS at 07:35

## 2024-06-13 RX ADMIN — DEXMEDETOMIDINE 12 MCG: 100 INJECTION, SOLUTION INTRAVENOUS at 07:57

## 2024-06-13 RX ADMIN — PROPOFOL 200 MG: 10 INJECTION, EMULSION INTRAVENOUS at 07:41

## 2024-06-13 RX ADMIN — HYDROMORPHONE HYDROCHLORIDE 0.2 MG: 0.2 INJECTION, SOLUTION INTRAMUSCULAR; INTRAVENOUS; SUBCUTANEOUS at 12:05

## 2024-06-13 RX ADMIN — ROCURONIUM BROMIDE 50 MG: 10 INJECTION, SOLUTION INTRAVENOUS at 07:41

## 2024-06-13 RX ADMIN — PROPOFOL 120 MCG/KG/MIN: 10 INJECTION, EMULSION INTRAVENOUS at 07:45

## 2024-06-13 NOTE — DISCHARGE INSTRUCTIONS
[facility] - Gynecologic Oncology  Toro Ang Boulay and Kartik  [phone]    Hysterectomy Discharge Instructions    WHAT YOU NEED TO KNOW:   A hysterectomy is surgery to remove your uterus. Your ovaries, fallopian tubes, cervix, or part of your vagina may also need to be removed. The organs and tissue that will be removed depends on your medical condition.  After a hysterectomy, you will not be able to become pregnant.  If your ovaries are removed, you will go through menopause if you have not already.    DISCHARGE INSTRUCTIONS:   Contact your doctor at the number above if:   You have a fever over 101o.  You have nausea or are vomiting that does not improve after a light meal.   Your pain is getting worse, even after you take medicine.   You feel pain or burning when you urinate, or you have trouble urinating.   You have pus or a foul-smelling odor coming from your vagina.    Your wound is red, swollen, or draining pus.  You see new or an increased amount of bright red blood coming from your vagina or your incisions.   You have questions or concerns about your condition or care.    Seek care immediately:   Your arm or leg feels warm, tender, and painful. It may look swollen and red.  You have increasing abdominal or pelvic pain.   You have heavy vaginal bleeding that fills 1 or more sanitary pads in 1 hour.    Call 911 for any of the following:   You feel lightheaded, short of breath, and have chest pain.   You cough up blood.    Medicines: You may need any of the following:  Prescription medicine may be given. You may receive a prescription for pain medication or be advised to use over the counter (OTC) pain medication such as acetaminophen (Tylenol) or ibuprofen (Advil, Motrin). Ask your healthcare provider how to take this medicine safely.  NSAIDs , such as ibuprofen, help decrease swelling, pain, and fever. NSAIDs can cause stomach bleeding or kidney problems in certain  people. If you take blood thinner medicine, always ask your healthcare provider if NSAIDs are safe for you. Always read the medicine label and follow directions.   Stool softeners help treat or prevent constipation.    Take your medicine as directed. Contact your healthcare provider if you think your medicine is not helping or if you have side effects. Tell him or her if you are allergic to any medicine. Keep a list of the medicines, vitamins, and herbs you take. Include the amounts, and when and why you take them. Bring the list or the pill bottles to follow-up visits. Carry your medicine list with you in case of an emergency.    Activity:   Rest as needed. Get up and move around as directed to help prevent blood clots. Start with short walks and slowly increase the distance every day. Limit the number of times you climb stairs to 2 times each day for the first week. Plan most of your daily activities on one level of your home.      Do not lift objects heavier than 10 pounds for 6 weeks. Avoid strenuous activity for 2 weeks.      Do not strain during bowel movements. High-fiber foods and extra liquids can help you prevent constipation. Examples of high-fiber foods are fruit and bran. Prune juice and water are good liquids to drink.      Do not have sex, use tampons, or douche for up to 8 weeks.     You may shower as soon as the day after surgery.  Tub baths can be taken starting 2 weeks after surgery.Do not go into pools or hot tubs until cleared by your doctor.      Ask when it is safe for you to drive. It is generally safe to drive after 2 weeks and when no longer taking prescription pain medication.    Ask when you may return to work and to other regular activities.    Wound care: Care for your abdominal incisions as directed. Carefully wash around the wound with soap and water. If you have Hibiclens or medicated soap that you were instructed to use before surgery, you may use that to wash with for up to 2 days  after surgery.  If not, any mild non-scented, non-abrasive soap is safe.  It is okay to let the soap and water run over your incision. Do not scrub your incision. Dry the area and put on new, clean bandages as directed. Change your bandages when they get wet or dirty. If you have strips of medical tape, let them fall off on their own. It may take 7 to 14 days for them to fall off. Check your incision every day for redness, swelling, or pus.   Deep breathing: Take deep breaths and cough 10 times each hour. This will decrease your risk for a lung infection. Take a deep breath and hold it for as long as you can. Let the air out and then cough strongly. Deep breaths help open your airway. You may be given an incentive spirometer to help you take deep breaths. Put the plastic piece in your mouth and take a slow, deep breath, then let the air out and cough. Repeat these steps 10 times every hour.   Get support: This surgery may be life-changing for you and your family. You will no longer be able to get pregnant. Sudden changes in the levels of your hormones may occur and cause mood swings and depression. You may feel angry, sad, or frightened, or cry frequently and unexpectedly. These feelings are normal. Talk to your healthcare provider about where you can get support. You can also ask if hormone replacement medicine is right for you.   Follow up with your healthcare provider or gynecologist as directed: You may need to return to have stitches removed, and for other tests. Write down your questions so you remember to ask them during your visits.      © 2017 [employer] Information is for End User's use only and may not be sold, redistributed or otherwise used for commercial purposes. All illustrations and images included in CareNotes® are the copyrighted property of [employer].A[employer] Inc[employer] or [employer].  The above information is an  only. It is not intended as medical advice for  individual conditions or treatments. Talk to your doctor, nurse or pharmacist before following any medical regimen to see if it is safe and effective for you.

## 2024-06-13 NOTE — ANESTHESIA PREPROCEDURE EVALUATION
Procedure:  HYSTERECTOMY LAPAROSCOPIC TOTAL (LTH) W/ ROBOTICS BILATERAL SALPINGO-OOPHORECTOMY, EXAM UNDER ANESTHESIA, POSSIBLE CAUTERY OF ENDOMETRIUM, ALL OTHER INDICATED PROCEDURES (Abdomen)  PLACEMENT OF BILATERAL URETERAL CATHETERS WITH CYSTOSCOPY (Bilateral: Bladder)    Relevant Problems   No relevant active problems        Physical Exam    Airway    Mallampati score: I  TM Distance: >3 FB  Neck ROM: full     Dental       Cardiovascular  Cardiovascular exam normal    Pulmonary  Pulmonary exam normal     Other Findings  post-pubertal.      Anesthesia Plan  ASA Score- 1     Anesthesia Type- general with ASA Monitors.         Additional Monitors:     Airway Plan: ETT.           Plan Factors-Exercise tolerance (METS): >4 METS.    Chart reviewed. EKG reviewed. Imaging results reviewed. Existing labs reviewed. Patient summary reviewed.    Patient is not a current smoker.  Patient did not smoke on day of surgery.    Obstructive sleep apnea risk education given perioperatively.        Induction- intravenous.    Postoperative Plan- Plan for postoperative opioid use. Planned trial extubation        Informed Consent- Anesthetic plan and risks discussed with patient.  I personally reviewed this patient with the CRNA. Discussed and agreed on the Anesthesia Plan with the CRNA..

## 2024-06-13 NOTE — QUICK NOTE
"Progress Note - OB/GYN  Loli Esquivel 39 y.o. female MRN: [id number]  Unit/Bed#: OR POOL Encounter: [id number]      Subjective:  Pt reported severe pain in PACU, now feeling slightly better but feeling very sleepy after receiving phenergan. Quick catheter was replaced due to blood tinged urine after ureteral tigertail removal intra-op. Pt had difficutly with recovery after her prior surgery for endometriosis and required overnight stay.     Vitals:   /62   Pulse 83   Temp (!) 97.3 °F (36.3 °C) (Temporal)   Resp 16   Ht 5' 2\" (1.575 m)   Wt 73.5 kg (162 lb)   SpO2 100%   BMI 29.63 kg/m²       Intake/Output Summary (Last 24 hours) at 6/13/2024 1621  Last data filed at 6/13/2024 1430  Gross per 24 hour   Intake 1600 ml   Output 325 ml   Net 1275 ml       Invasive Devices       Peripheral Intravenous Line  Duration             Peripheral IV 06/13/24 Right Hand <1 day    Peripheral IV 06/13/24 Right;Ventral (anterior) Hand <1 day              Drain  Duration             Urethral Catheter Non-latex 16 Fr. <1 day                    General: Well appearing, no acute distress  Respiratory: Unlabored breathing  Cardiovascular: Regular rate.  Abdomen: Soft,  nontender  Extremities: Warm and well perfused.  Non tender.        Labs:   Admission on 06/13/2024   Component Date Value    EXT Preg Test, Ur 06/13/2024 Negative     Control 06/13/2024 Valid     ABO Grouping 06/13/2024 A     Rh Factor 06/13/2024 Positive     POC Glucose 06/13/2024 96     Case Report 06/13/2024                      Value:Surgical Pathology Report                         Case: [id number]                                  Authorizing Provider:  Giuseppe Moore MD       Collected:           06/13/2024 0902              Ordering Location:     [facility]        Received:            06/13/2024 [address] [place] [address] Operating Room                                                      Pathologist:           Rekha" Kacey Nam MD                                                         Intraop:               Rekha Nam MD                                                         Specimens:   A) - Urinary Bladder, Bladder Flap                                                                  B) - Ovary, Right, Right Ovary                                                             Intraoperative Consultat* 06/13/2024                      Value:AF1:  Foreign material with hemorrhage, siderosis, and foreign body giant cell reaction associated with acute and chronic inflammation and reactive fibrosis. Negative for malignancy.  Dr. CONNOR Moore notified 6/13/24, 0930 hours.     *Electronic Signature* Rekha Nam M.D.   Interpretation performed at [facility], [address] [place] [id number].    BF1:  Foreign material with foreign body giant cell reaction and hemorrhage.  Dr. CONNOR Moore notified 6/13/24, 1000 hours.     *Electronic Signature* Rekha Nam M.D.   Interpretation performed at [facility], [address] [place] [id number].        WBC 06/13/2024 11.66 (H)     RBC 06/13/2024 3.94     Hemoglobin 06/13/2024 10.2 (L)     Hematocrit 06/13/2024 33.4 (L)     MCV 06/13/2024 85     MCH 06/13/2024 25.9 (L)     MCHC 06/13/2024 30.5 (L)     RDW 06/13/2024 13.5     Platelets 06/13/2024 288     MPV 06/13/2024 10.0          Assessment:  Postop Day #0 s/p RA-TLH, BSO, extensive lysis of adhesions, cysto, ureteral stent placement and removal , stable    Plan:  - Plan for observation overnight for pain control  - Plan to follow up AM labs and remove etienne in the morning  - Anticipate d/c 6/14    D/w Dr. Moore, who assessed patient with tesha Leon,   6/13/2024  4:21 PM

## 2024-06-13 NOTE — NURSING NOTE
Pt with complaints of constant internal abdominal pain and urgency. Dr Moore contacted and etienne placed and cbc drawn. Urine joe blood and MD notified. Anesthesia at bedside and orders for pain taken and followed. VSS pt oreinted and alert. Pain 10/10. Medicated for pain as ordered.

## 2024-06-13 NOTE — H&P
Assessment/Plan:     Problem List Items Addressed This Visit         Endometriosis - Primary       Patient very pleasant 39-year-old female with a recent diagnosis of stage IV endometriosis and bilateral ovarian cysts.  She is being symptomatic for this.  Attempt at laparoscopic surgery was unsuccessful.  We discussed treatment related options.  The patient would like to move ahead with surgery.  She has used oral contraceptives in the past without significant benefit.  We have discussed the likely need for post surgery hormone replacement therapy as we have recommended removal of uterus cervix tubes and ovaries given the significant pathology identified at time of recent surgery.      We have recommended the following:     Robotically assisted total laparoscopic hysterectomy bilateral salpingo-oophorectomy with possible pelvic and para-aortic lymph node dissection staging biopsies including omentectomy based on findings at frozen section..  Have discussed risks and benefits of the procedure including bleeding requiring transfusion infection, infection, damage to local structures including bowel bladder ureter and other local organs.  Additionally cystoscopy with placement of ureteral catheters is recommended to help to minimize risk of damage to the ureters we discussed the risk of deep venous thrombosis.  An open procedure may be required.  All of these complications are in the 2-4% range of likelihood.  The patient understands the risks and benefits of the procedure and has signed an informed consent.  I personally signed the consent form with her.  She does understand that further treatment including chemotherapy radiation therapy or hormones may be required based on the final postoperative pathologic diagnosis and staging.  Standard preoperative testing including type and screen is CBC CPM P chest x-ray and EKG will be ordered.  Any appropriate consultations for preoperative evaluation will also be ordered.   Overall consultation took 60 min with greater than 50% in dedicated toward discussion time.            Relevant Orders     Case request operating room: HYSTERECTOMY LAPAROSCOPIC TOTAL (LTH) W/ ROBOTICS bilateral salpingo-oophorectomy, cystoscopy with bilateral ureteral catheters (Completed)     CBC and differential     Basic metabolic panel     Type and screen     EKG 12 lead     XR chest pa & lateral     Bilateral ovarian cysts     Relevant Orders     Case request operating room: HYSTERECTOMY LAPAROSCOPIC TOTAL (LTH) W/ ROBOTICS bilateral salpingo-oophorectomy, cystoscopy with bilateral ureteral catheters (Completed)     CBC and differential     Basic metabolic panel     Type and screen     EKG 12 lead     XR chest pa & lateral               CHIEF COMPLAINT: Stage IV endometriosis bilateral ovarian cysts           Patient ID: Lloi Esquivel is a 39 y.o. female  Patient is a very pleasant 39-year-old female with a history of stage IV endometriosis seen in consultation from Dr. Kofi Daley.     Patient has a history of chronic pelvic pain and endometriosis.  Patient noticed for the past 3 months heavier.  Followed by 2 to 3 weeks of debilitating abdominal pain.  Prior to this the patient had not had any significant pelvic pain.  She      She underwent CAT scan of the abdomen and pelvis and pelvic ultrasound in April 2024.  This revealed a uterus measuring 10 x 7 x 6.5 cm.  Right ovary measured 8 x 7 x 5.6 cm with a large complex cyst with low-level echoes consistent with an endometrioma.  The left ovary measured 6 x 7 x 5.2 cm with a similar echo appearance also consistent with endometriosis.  Patient underwent diagnostic laparoscopy, cystourethroscopy drainage of large right endometrioma and intraoperative hemorrhage requiring extensive tamponade it was elected not to proceed with procedure.  Estimated blood loss was approximately 400 to 500 cc.  Findings were noted for stage IV endometriosis with obliteration  of the cul-de-sac.     Patient presents for further surgical management.     Today, the patient is doing well.  She denies significant abdominal pain, pelvic pain, nausea, vomiting, constipation, diarrhea, fevers, chills, or vaginal bleeding.  Incisions have been healing well.  The umbilical incision has been having minimal clear drainage              Review of Systems   Constitutional: Negative.    HENT: Negative.     Eyes: Negative.    Respiratory: Negative.     Cardiovascular: Negative.    Gastrointestinal: Negative.    Endocrine: Negative.    Genitourinary: Negative.    Musculoskeletal: Negative.    Skin: Negative.    Neurological: Negative.    Hematological: Negative.    Psychiatric/Behavioral: Negative.           Current Medications          Current Outpatient Medications   Medication Sig Dispense Refill    Elmiron 100 MG capsule Take 100 mg by mouth 3 (three) times a day before meals        cholecalciferol (VITAMIN D3) 1,000 units tablet Take 1,000 Units by mouth daily (Patient not taking: Reported on 5/8/2024)        Multiple Vitamin (multivitamin) capsule Take 1 capsule by mouth daily (Patient not taking: Reported on 5/8/2024)        oxyCODONE-acetaminophen (PERCOCET) 5-325 mg per tablet Take 1 tablet by mouth every 3 (three) hours as needed (Patient not taking: Reported on 5/8/2024)        phentermine (ADIPEX-P) 37.5 MG tablet Take 37.5 mg by mouth daily (Patient not taking: Reported on 5/8/2024)        topiramate (TOPAMAX) 25 mg tablet Take 25 mg by mouth daily at bedtime (Patient not taking: Reported on 5/8/2024)          No current facility-administered medications for this visit.            Allergies   No Known Allergies        Medical History        Past Medical History:   Diagnosis Date    Chronic pain in female pelvis      Dysmenorrhea      Endometriosis      Interstitial cystitis      Other ovarian cyst, left side      Pre-eclampsia      Rhabdomyolysis              Surgical History         Past  "Surgical History:   Procedure Laterality Date    DILATION AND CURETTAGE OF UTERUS   2007    MOUTH SURGERY                OB History            4    Para   3    Term   0       1    AB   1    Living   3           SAB   1    IAB        Ectopic        Multiple        Live Births   3                     Family History         Family History   Problem Relation Age of Onset    No Known Problems Mother      Heart attack Father      Other Brother           Sepsis            The following portions of the patient's history were reviewed and updated as appropriate: allergies, current medications, past family history, past medical history, past social history, past surgical history, and problem list.        Objective:     Blood pressure 124/74, pulse 90, temperature 98.5 °F (36.9 °C), temperature source Tympanic, resp. rate 18, height 5' 2\" (1.575 m), weight 73.5 kg (162 lb), last menstrual period 2024, SpO2 98%.  Body mass index is 29.63 kg/m².     Physical Exam  Constitutional:       Appearance: She is well-developed.   HENT:      Head: Normocephalic and atraumatic.   Eyes:      Pupils: Pupils are equal, round, and reactive to light.   Cardiovascular:      Rate and Rhythm: Normal rate and regular rhythm.      Heart sounds: Normal heart sounds.   Pulmonary:      Effort: Pulmonary effort is normal. No respiratory distress.      Breath sounds: Normal breath sounds.   Abdominal:      General: Bowel sounds are normal. There is no distension.      Palpations: Abdomen is soft. Abdomen is not rigid.      Tenderness: There is no abdominal tenderness. There is no guarding or rebound.   Genitourinary:     Comments: -Normal external female genitalia, normal Bartholin's and Nocona's glands                  -Normal midline urethral meatus. No lesions notes                  -Bladder without fullness mass or tenderness                  -Vagina without lesion or discharge No significant cystocele or rectocele noted           "        -Cervix normal appearing without visible lesions                  -Uterus with normal contour, mobility. No tenderness,                  -Adnexae with enlarged nontender cystic structure in the cul-de-sac measuring approximately 8 x 8 cm.  No sidewall encroachment bilaterally                  - Anus without fissure of lesion     Musculoskeletal:         General: Normal range of motion.      Cervical back: Normal range of motion and neck supple.   Lymphadenopathy:      Cervical: No cervical adenopathy.      Upper Body:      Right upper body: No supraclavicular adenopathy.      Left upper body: No supraclavicular adenopathy.   Skin:     General: Skin is warm and dry.   Neurological:      Mental Status: She is alert and oriented to person, place, and time.   Psychiatric:         Behavior: Behavior normal.

## 2024-06-13 NOTE — OP NOTE
OPERATIVE REPORT  PATIENT NAME: Loli Esquivel    :  1985  MRN: [id number]  Pt Location: AN OR ROOM 04    SURGERY DATE: 2024    Surgeons and Role:     * Giuseppe Moore MD - Primary     * Elena Mayer PA-C - Assisting     * Chevy Leon DO - Assisting     * Ritchie Duval MD - Fellow    Preop Diagnosis:  Endometriosis N80.9  Bilateral ovarian cysts N83.201, N83.202    Post-Op Diagnosis Codes:     * Endometriosis [N80.9]     * Bilateral ovarian cysts [N83.201, N83.202]    Procedure(s):  HYSTERECTOMY LAPAROSCOPIC TOTAL (LTH) W/ ROBOTICS BILATERAL SALPINGO-OOPHORECTOMY. EXAM UNDER ANESTHESIA. EXTENSIVE LYSIS OF ADHESIONS  Bilateral - PLACEMENT OF BILATERAL URETERAL CATHETERS WITH CYSTOSCOPY    Specimen(s):  ID Type Source Tests Collected by Time Destination   1 : Bladder Flap Tissue Urinary Bladder TISSUE EXAM Giuseppe Moore MD 2024 0902    2 : Right Ovary Tissue Ovary, Right TISSUE EXAM Giuseppe Moore MD 2024 0933    3 : Right Ovary Tissue Ovary, Right TISSUE EXAM Giuseppe Moore MD 2024 1044    4 : Uterus, Cervix, B/L Fallopian Tubes, Left Ovary Tissue Uterus TISSUE EXAM Giuseppe Moore MD 2024 1041        Estimated Blood Loss:   100 mL    Drains:  REMOVED urethral Catheter Non-latex;Double-lumen 16 Fr. (Removed)   Number of days: 0       REMOVED ureteral Internal Stent Right ureter  (Removed)   Number of days: 0       REMOVED ureteral Internal Stent Left ureter  (Removed)   Number of days: 0       Anesthesia Type:   General    Operative Indications:  Endometriosis N80.9  Bilateral ovarian cysts N83.201, N83.202      Operative Findings:  Cystoscopy indicated normal bladder and ureteral orifice ease.  Ureteral stents were placed without difficulty.    Stage IV endometriosis with bilateral ovarian endometriomas.  Left was approximately 3 x 3 cm.  Right was approximately 5 x 5 cm and densely adherent into the cul-de-sac.  The sigmoid colon was densely adherent to  the bilateral adnexal masses and the uterus.  Previous hemorrhage site in hemoclips in the right pelvic sidewall were noted.  Surgicel had been placed into the pelvis.  2 frozen sections indicated foreign body giant cell reaction likely related to the Surgicel.  No obvious evidence of malignancy.  Significant lysis of adhesions lasting an hour and a half was required to safely separate the ovaries bilaterally from the sigmoid colon and the pelvic sidewalls.  Both ovaries ruptured in the process.  This leaked chocolate fluid.    Upon completion of the procedure the ureteral catheters were intact upon removal.  There was no injury to bowel by inspection.      Complications:   None    Procedure and Technique:    The patient was identified as herself and and appropriate time-out procedure was performed.    The patient was placed in dorsal lithotomy position and prepped and draped in the usual sterile fashion including a 3 times vaginal chlorhexadine prep.  Attention was turned to the perineum.  Cystoscope was placed into the bladder and bilateral ureteral catheters were placed without difficulty.   Quick catheter was placed without difficulty.  An Chichi uterine manipulator with large Koh ring and 10 cm  was then placed into the uterus without difficulty.    Attention was then turned to the abdomen where planned incision sites were marked and infiltrated with 0.5% Marcaine.  The periumbilical incision was created with a knife.  The Veress needle was placed without difficulty.  The abdomen was insufflated with sterile gas.  The remainder of the incisions were created with a knife.  The 8 mm trocar was placed into the angelica umbilical incision and a camera was placed without difficulty.  Under direct visualization the 3 other DaVinci 8 mm ports were placed without difficulty.  A 5 mm assistant port was placed in the right lower quadrant without difficulty.  The patient was placed in steep Trendelenburg position.   The robot was brought in and side docked without difficulty.  Electrocautery sheers were placed in the number 1 arm, a Vessel Sealer was placed in the 3. Arm, and a Prograsp was placed in the 4. Arm.    I broke scrub an approached the console.  Throughout the case extensive lysis of adhesions lasting an hour and a half was required to take down the dense adhesions between the ovaries: Uterus and pelvic sidewalls.  The right round ligament was taken down with the Vessel Sealer.  The right pelvic sidewall was opened with cautery traci.  The perivesical and pararectal spaces were open.  The infundibular pelvic ligament and ureter on the right were  in this retroperitoneal space.  The path of the ureter was identified.  The infundibular pelvic ligament was then taken down with the Vessel Sealer in multiple bites.  The right utero-ovarian ligament was taken down with the vessel sealer.  The specimen and tissue was removed and placed in the upper abdomen.  The ovary was taken out in a piecemeal fashion.  Several areas of abnormality were identified.  Frozen section identified this is foreign body giant cell reaction likely consistent with Surgicel placement.    The posterior broad ligament was taken down with electrocautery traci.  The bladder flap was begun with the electrocautery Traci and taken down to the upper vagina.  The uterine artery pedicle was skeletonized and taken down with the Vessel Sealer.  The cardinal ligament was taken down with sequential bites with the Vessel Sealer.  The uterus sacral ligament was taken down with the Vessel Sealer.     The same procedure was then performed on the patient's left-hand side again taking care to identify the ureter prior to taking down the infundibular pelvic ligament.  The vaginal cuff was then taken down with Electrocautery Traci.  The specimen was retrieved through the vagina with a single tooth tenaculum.  The vaginal cuff was closed with a running  suture of 2 0 Stratafix.     The ureteral stents were removed without difficulty and noted to be intact.      The abdomen was explored and no further bleeding sites were noted.  The pneumoperitoneum was allowed to escape and no bleeding was noted.  All instruments were removed.  The trocars were removed.  The incision sites were closed with 4 0 Monocryl without difficulty.    The patient tolerated procedure without complications.  The sponge and instrument counts were correct prior to closure.  Hemostasis was assured prior to closure.  The patient was brought to the recovery room in stable condition.    Overall this was a very difficult case with lysis of adhesions taking at least an hour and a half to remove the uterus cervix tubes and ovaries safely.    Giuseppe Moore MD  Director of Cancer Survivorship  Division of GYN Oncology  [facility].    I was present for the entire procedure.    Patient Disposition:  PACU         SIGNATURE: Giuseppe Moore MD  DATE: June 13, 2024  TIME: 11:31 AM

## 2024-06-13 NOTE — INTERVAL H&P NOTE
H&P reviewed. After examining the patient I find no changes in the patients condition since the H&P had been written.  Plan robot-assisted total laparoscopic hysterectomy bilateral salpingo-oophorectomy with placement of bilateral ureteral catheters secondary to stage IV endometriosis.  Preoperative testing is stable for surgery today.    Vitals:    06/13/24 0638   BP: 141/88   Pulse: 67   Resp: 16   Temp: 97.5 °F (36.4 °C)   SpO2: 100%

## 2024-06-14 VITALS
HEART RATE: 73 BPM | DIASTOLIC BLOOD PRESSURE: 69 MMHG | RESPIRATION RATE: 16 BRPM | BODY MASS INDEX: 29.81 KG/M2 | TEMPERATURE: 97.8 F | SYSTOLIC BLOOD PRESSURE: 132 MMHG | WEIGHT: 162 LBS | OXYGEN SATURATION: 99 % | HEIGHT: 62 IN

## 2024-06-14 PROBLEM — Z90.79 S/P TOTAL HYSTERECTOMY AND BSO (BILATERAL SALPINGO-OOPHORECTOMY): Status: ACTIVE | Noted: 2024-06-14

## 2024-06-14 PROBLEM — Z90.710 S/P TOTAL HYSTERECTOMY AND BSO (BILATERAL SALPINGO-OOPHORECTOMY): Status: ACTIVE | Noted: 2024-06-14

## 2024-06-14 PROBLEM — Z90.722 S/P TOTAL HYSTERECTOMY AND BSO (BILATERAL SALPINGO-OOPHORECTOMY): Status: ACTIVE | Noted: 2024-06-14

## 2024-06-14 LAB
ANION GAP SERPL CALCULATED.3IONS-SCNC: 5 MMOL/L (ref 4–13)
BASOPHILS # BLD AUTO: 0.02 THOUSANDS/ÂΜL (ref 0–0.1)
BASOPHILS NFR BLD AUTO: 0 % (ref 0–1)
BUN SERPL-MCNC: 8 MG/DL (ref 5–25)
CALCIUM SERPL-MCNC: 7.8 MG/DL (ref 8.4–10.2)
CHLORIDE SERPL-SCNC: 109 MMOL/L (ref 96–108)
CO2 SERPL-SCNC: 26 MMOL/L (ref 21–32)
CREAT SERPL-MCNC: 0.56 MG/DL (ref 0.6–1.3)
EOSINOPHIL # BLD AUTO: 0 THOUSAND/ÂΜL (ref 0–0.61)
EOSINOPHIL NFR BLD AUTO: 0 % (ref 0–6)
ERYTHROCYTE [DISTWIDTH] IN BLOOD BY AUTOMATED COUNT: 13.7 % (ref 11.6–15.1)
GFR SERPL CREATININE-BSD FRML MDRD: 117 ML/MIN/1.73SQ M
GLUCOSE P FAST SERPL-MCNC: 103 MG/DL (ref 65–99)
GLUCOSE SERPL-MCNC: 103 MG/DL (ref 65–140)
HCT VFR BLD AUTO: 26.9 % (ref 34.8–46.1)
HGB BLD-MCNC: 8.1 G/DL (ref 11.5–15.4)
IMM GRANULOCYTES # BLD AUTO: 0.03 THOUSAND/UL (ref 0–0.2)
IMM GRANULOCYTES NFR BLD AUTO: 0 % (ref 0–2)
LYMPHOCYTES # BLD AUTO: 1.2 THOUSANDS/ÂΜL (ref 0.6–4.47)
LYMPHOCYTES NFR BLD AUTO: 12 % (ref 14–44)
MAGNESIUM SERPL-MCNC: 1.8 MG/DL (ref 1.9–2.7)
MCH RBC QN AUTO: 25.5 PG (ref 26.8–34.3)
MCHC RBC AUTO-ENTMCNC: 30.1 G/DL (ref 31.4–37.4)
MCV RBC AUTO: 85 FL (ref 82–98)
MONOCYTES # BLD AUTO: 0.77 THOUSAND/ÂΜL (ref 0.17–1.22)
MONOCYTES NFR BLD AUTO: 8 % (ref 4–12)
NEUTROPHILS # BLD AUTO: 7.76 THOUSANDS/ÂΜL (ref 1.85–7.62)
NEUTS SEG NFR BLD AUTO: 80 % (ref 43–75)
NRBC BLD AUTO-RTO: 0 /100 WBCS
PLATELET # BLD AUTO: 268 THOUSANDS/UL (ref 149–390)
PMV BLD AUTO: 10.3 FL (ref 8.9–12.7)
POTASSIUM SERPL-SCNC: 3.7 MMOL/L (ref 3.5–5.3)
RBC # BLD AUTO: 3.18 MILLION/UL (ref 3.81–5.12)
SODIUM SERPL-SCNC: 140 MMOL/L (ref 135–147)
WBC # BLD AUTO: 9.78 THOUSAND/UL (ref 4.31–10.16)

## 2024-06-14 PROCEDURE — 85025 COMPLETE CBC W/AUTO DIFF WBC: CPT

## 2024-06-14 PROCEDURE — NC001 PR NO CHARGE: Performed by: OBSTETRICS & GYNECOLOGY

## 2024-06-14 PROCEDURE — 80048 BASIC METABOLIC PNL TOTAL CA: CPT

## 2024-06-14 PROCEDURE — 83735 ASSAY OF MAGNESIUM: CPT

## 2024-06-14 RX ADMIN — OXYCODONE HYDROCHLORIDE 10 MG: 10 TABLET ORAL at 04:20

## 2024-06-14 RX ADMIN — OXYCODONE HYDROCHLORIDE 10 MG: 10 TABLET ORAL at 14:45

## 2024-06-14 RX ADMIN — IBUPROFEN 600 MG: 600 TABLET, FILM COATED ORAL at 12:02

## 2024-06-14 RX ADMIN — KETOROLAC TROMETHAMINE 15 MG: 30 INJECTION, SOLUTION INTRAMUSCULAR; INTRAVENOUS at 05:28

## 2024-06-14 RX ADMIN — ACETAMINOPHEN 975 MG: 325 TABLET, FILM COATED ORAL at 12:02

## 2024-06-14 RX ADMIN — ACETAMINOPHEN 1000 MG: 10 INJECTION INTRAVENOUS at 04:20

## 2024-06-14 RX ADMIN — DOCUSATE SODIUM 100 MG: 100 CAPSULE, LIQUID FILLED ORAL at 08:57

## 2024-06-14 NOTE — PLAN OF CARE
Problem: PAIN - ADULT  Goal: Verbalizes/displays adequate comfort level or baseline comfort level  Description: Interventions:  - Encourage patient to monitor pain and request assistance  - Assess pain using appropriate pain scale  - Administer analgesics based on type and severity of pain and evaluate response  - Implement non-pharmacological measures as appropriate and evaluate response  - Consider cultural and social influences on pain and pain management  - Notify physician/advanced practitioner if interventions unsuccessful or patient reports new pain  Outcome: Progressing     Problem: INFECTION - ADULT  Goal: Absence or prevention of progression during hospitalization  Description: INTERVENTIONS:  - Assess and monitor for signs and symptoms of infection  - Monitor lab/diagnostic results  - Monitor all insertion sites, i.e. indwelling lines, tubes, and drains  - Monitor endotracheal if appropriate and nasal secretions for changes in amount and color  - [place] appropriate cooling/warming therapies per order  - Administer medications as ordered  - Instruct and encourage patient and family to use good hand hygiene technique  - Identify and instruct in appropriate isolation precautions for identified infection/condition  Outcome: Progressing     Problem: SAFETY ADULT  Goal: Patient will remain free of falls  Description: INTERVENTIONS:  - Educate patient/family on patient safety including physical limitations  - Instruct patient to call for assistance with activity   - Consult OT/PT to assist with strengthening/mobility   - Keep Call bell within reach  - Keep bed low and locked with side rails adjusted as appropriate  - Keep care items and personal belongings within reach  - Initiate and maintain comfort rounds  - Make Fall Risk Sign visible to staff  - Obtain necessary fall risk management equipment  - Apply yellow socks and bracelet for high fall risk patients  - Consider moving patient to room near nurses  station  Outcome: Progressing     Problem: DISCHARGE PLANNING  Goal: Discharge to home or other facility with appropriate resources  Description: INTERVENTIONS:  - Identify barriers to discharge w/patient and caregiver  - Arrange for needed discharge resources and transportation as appropriate  - Identify discharge learning needs (meds, wound care, etc.)  - Arrange for interpretive services to assist at discharge as needed  - Refer to Case Management Department for coordinating discharge planning if the patient needs post-hospital services based on physician/advanced practitioner order or complex needs related to functional status, cognitive ability, or social support system  Outcome: Progressing     Problem: Knowledge Deficit  Goal: Patient/family/caregiver demonstrates understanding of disease process, treatment plan, medications, and discharge instructions  Description: Complete learning assessment and assess knowledge base.  Interventions:  - Provide teaching at level of understanding  - Provide teaching via preferred learning methods  Outcome: Progressing

## 2024-06-14 NOTE — ASSESSMENT & PLAN NOTE
POD#1 s/p RA-TLH, BSO, extensive lysis of adhesions, cysto, ureteral stent placement and removal, EBL 100cc  Overnight stay for post operative pain control  S/p etienne removal this morning, pending void trial   Pain: Tylenol and toradol -> motrin scheduled, johan 5/10 PRN  FEN: Tolerating regular diet  DVT ppx: SCDs  Passing flatus   Incisions C/D/I     Hgb / Hct       Results from last 7 days   Lab Units 06/14/24  0524 06/13/24  1347   HEMOGLOBIN g/dL 8.1* 10.2*   HEMATOCRIT % 26.9* 33.4*   Preadmission Hgb 10.5 on 6/4/24

## 2024-06-14 NOTE — PROGRESS NOTES
"For questions/concerns on this patient, please reach out to the following:  [facility]- GynOn Resident   Gyn Oncology Progress note   Loli Esquivel 39 y.o. female MRN: [id number]  Unit/Bed#: S -01 Encounter: [id number]    Assessment/Plan:    39 y.o. POD#1 s/p RA-TLH, BSO, extensive lysis of adhesions, cysto, ureteral stent placement and removal for stage IV Endometriosis and bilateral ovarian cysts    S/P robotic assisted total hysterectomy and BSO (bilateral salpingo-oophorectomy)  Assessment & Plan  POD#1 s/p RA-TLH, BSO, extensive lysis of adhesions, cysto, ureteral stent placement and removal, EBL 100cc  Overnight stay for post operative pain control  S/p etienne removal this morning, pending void trial   Pain: Tylenol and toradol -> motrin scheduled, johan 5/10 PRN  FEN: Tolerating regular diet  DVT ppx: SCDs  Passing flatus   Incisions C/D/I     Hgb / Hct       Results from last 7 days   Lab Units 06/14/24  0524 06/13/24  1347   HEMOGLOBIN g/dL 8.1* 10.2*   HEMATOCRIT % 26.9* 33.4*   Preadmission Hgb 10.5 on 6/4/24          Plan for discharge today, pending void trial    Subjective:    Loli Esquivel reports some pain in her lower/mid back, but otherwise feeling much better than yesterday. No abdominal or pelvic pain. No other current complaints.  Overnight events: none. Pain is well controlled with toradol, tylenol.  Patient has voided once since her catheter was removed about an hour ago. Reports urine is not as red as yesterday immediately post op.   She is ambulating.  Patient is currently passing flatus and has had no bowel movement. She is tolerating PO, and denies nausea or vomiting. Patient denies fever, chills, chest pain, shortness of breath, or calf tenderness.     Objective  /73   Pulse 98   Temp 98.9 °F (37.2 °C)   Resp 18   Ht 5' 2\" (1.575 m)   Wt 73.5 kg (162 lb)   SpO2 97%   BMI 29.63 kg/m²     I/O last 3 completed shifts:  In: 1600 [I.V.:1250; IV Piggyback:350]  Out: 825 [Urine:725; " Blood:100]  I/O this shift:  In: -   Out: 1400 [Urine:1400]    Lab Results   Component Value Date    WBC 9.78 06/14/2024    HGB 8.1 (L) 06/14/2024    HCT 26.9 (L) 06/14/2024    MCV 85 06/14/2024     06/14/2024       Lab Results   Component Value Date    CALCIUM 8.7 06/04/2024    K 3.9 06/04/2024    CO2 26 06/04/2024     06/04/2024    BUN 10 06/04/2024    CREATININE 0.62 06/04/2024         Physical Exam  Vitals reviewed. Exam conducted with a chaperone present.   Constitutional:       General: She is not in acute distress.     Appearance: She is not ill-appearing.   HENT:      Head: Normocephalic and atraumatic.      Right Ear: External ear normal.      Left Ear: External ear normal.      Nose: Nose normal.      Mouth/Throat:      Pharynx: Oropharynx is clear.   Eyes:      Extraocular Movements: Extraocular movements intact.      Conjunctiva/sclera: Conjunctivae normal.   Cardiovascular:      Rate and Rhythm: Normal rate.      Pulses: Normal pulses.   Pulmonary:      Effort: Pulmonary effort is normal. No respiratory distress.   Abdominal:      General: There is no distension.      Palpations: Abdomen is soft.      Tenderness: There is no abdominal tenderness. There is no guarding or rebound.      Comments: Incisions c/d/i   Musculoskeletal:         General: No swelling. Normal range of motion.      Cervical back: Normal range of motion.   Skin:     General: Skin is warm and dry.   Neurological:      General: No focal deficit present.      Mental Status: She is alert.      Motor: No weakness.   Psychiatric:         Mood and Affect: Mood normal.         Thought Content: Thought content normal.         Judgment: Judgment normal.           Chevy Leon, DO  6/14/2024  6:24 AM

## 2024-06-14 NOTE — QUICK NOTE
S.  Patient feeling comfortable this morning.  Pain level at 2.  She has ambulated and voided.  She is tolerating diet.    O.  Vital signs stable afebrile urine output acceptable.  Hemoglobin 8.  Remainder of blood work stable.    After conversation this morning patient meeting criteria for discharge.  Quick catheter has been removed and she is voiding without difficulty.  We have discussed postoperative instructions.  Patient will follow-up as previously scheduled.  Opioid prescription called in yesterday.

## 2024-06-17 PROCEDURE — 88305 TISSUE EXAM BY PATHOLOGIST: CPT | Performed by: PATHOLOGY

## 2024-06-17 PROCEDURE — 88307 TISSUE EXAM BY PATHOLOGIST: CPT | Performed by: PATHOLOGY

## 2024-06-18 ENCOUNTER — TELEPHONE (OUTPATIENT)
Dept: HEMATOLOGY ONCOLOGY | Facility: CLINIC | Age: 39
End: 2024-06-18

## 2024-06-18 NOTE — TELEPHONE ENCOUNTER
Patient Call    Who are you speaking with? Patient    If it is not the patient, are they listed on an active communication consent form? Yes   What is the reason for this call? Post op question, problem at IV site   Does this require a call back? Yes   If a call back is required, please list best call back number [phone]    If a call back is required, advise that a message will be forwarded to their care team and someone will return their call as soon as possible.   Did you relay this information to the patient? Yes

## 2024-07-03 ENCOUNTER — OFFICE VISIT (OUTPATIENT)
Dept: GYNECOLOGIC ONCOLOGY | Facility: CLINIC | Age: 39
End: 2024-07-03

## 2024-07-03 VITALS
HEART RATE: 75 BPM | WEIGHT: 163.2 LBS | SYSTOLIC BLOOD PRESSURE: 132 MMHG | HEIGHT: 62 IN | TEMPERATURE: 98.6 F | OXYGEN SATURATION: 98 % | BODY MASS INDEX: 30.03 KG/M2 | DIASTOLIC BLOOD PRESSURE: 82 MMHG

## 2024-07-03 DIAGNOSIS — N83.202 BILATERAL OVARIAN CYSTS: Primary | ICD-10-CM

## 2024-07-03 DIAGNOSIS — N83.201 BILATERAL OVARIAN CYSTS: Primary | ICD-10-CM

## 2024-07-03 PROCEDURE — 99024 POSTOP FOLLOW-UP VISIT: CPT | Performed by: OBSTETRICS & GYNECOLOGY

## 2024-07-03 NOTE — LETTER
July 3, 2024     KOTA Ellis  [address]  [address]  [facility] [address]    Patient: Loli Esquivel   YOB: 1985   Date of Visit: 7/3/2024       Dear Dr. Nam:    Thank you for referring Lloi Esquivel to me for evaluation. Below are my notes for this consultation.    If you have questions, please do not hesitate to call me. I look forward to following your patient along with you.         Sincerely,        Giuseppe Moore MD        CC: MD Giuseppe Vega MD  7/3/2024 10:36 AM  Incomplete  Assessment/Plan:    Problem List Items Addressed This Visit    None        CHIEF COMPLAINT: Postoperative evaluation        Patient ID: Loli Esquivel is a 39 y.o. female  Patient presents today for postoperative evaluation status post robot-assisted total laparoscopic hysterectomy bilateral salpingo-oophorectomy for stage IV endometriosis.  Surgery was uncomplicated but difficult due to significant scarring.  Final pathology report reveals the following:  Final Diagnosis  A. Peritoneum, Bladder Flap biopsy:  - Foreign material compatible with surgical hemostatic material associated with     hemorrhage, siderosis, foreign body giant cell reaction, acute and chronic inflammation    and reactive fibrosis.   - Negative for malignancy.     B. Ovary, Right, biopsy:  - Foreign material compatible with surgical hemostatic material associated with     foreign body giant cell reaction and hemorrhage.  - Negative for malignancy.       C. Ovary, Right, oophorectomy:  - Portions of benign ovary with endometriotic cyst (endometrioma).  - Portions of foreign material compatible with surgical hemostatic material associated     with hemorrhage, siderosis, foreign body giant cell reaction, reactive fibrosis and chronic     inflammation.    - Benign fallopian tube with serosal endometriosis.   - Negative for malignancy.        D. Uterus (251 grams), Cervix, B/L Fallopian Tubes, Left Ovary, hysterectomy  with bilateral   salpingectomy and left oophorectomy:  Cervix  - Acute and chronic cervicitis with squamous metaplasia.  - Negative for squamous intraepithelial lesion and malignancy.  Endometrium  - Benign proliferative endometrium with tubal metaplasia.  - Surgical site changes.   - Negative for hyperplasia, malignancy and chronic endometritis.  Myometrium  - Intramural leiomyomas.    -- No significant cytologic atypia, no increased mitoses and no tumor cell necrosis.  - Focal adenomyosis.   Serosa  - Endometriosis.  - Serosal fibrous adhesions associated with hemorrhage, siderosis and foreign material     compatible with surgical hemostatic material.  Left Ovary and Fallopian Tube  - Endometriosis and endometriotic cyst(s)/endometrioma(s).   - Serosal fibrous adhesions associated with hemorrhage and endometriosis.   - Negative for malignancy.      Today, the patient is doing well.  She denies significant abdominal pain, pelvic pain, nausea, vomiting, constipation, diarrhea, fevers, chills, or vaginal bleeding.  Patient is not having any significant hot flashes.  She is taking calcium and vitamin D and is not interested in hormone replacement therapy.           The following portions of the patient's history were reviewed and updated as appropriate: allergies, current medications, past family history, past medical history, past social history, past surgical history, and problem list.    Review of Systems   Constitutional: Negative.    HENT: Negative.     Eyes: Negative.    Respiratory: Negative.     Cardiovascular: Negative.    Gastrointestinal: Negative.    Endocrine: Negative.    Genitourinary: Negative.    Musculoskeletal: Negative.    Skin: Negative.    Neurological: Negative.    Hematological: Negative.    Psychiatric/Behavioral: Negative.         Current Outpatient Medications   Medication Sig Dispense Refill   • acetaminophen (TYLENOL) 325 mg tablet Take 650 mg by mouth every 6 (six) hours as needed for  "mild pain     • cholecalciferol (VITAMIN D3) 1,000 units tablet Take 1,000 Units by mouth daily     • Multiple Vitamin (multivitamin) capsule Take 1 capsule by mouth daily     • oxyCODONE (ROXICODONE) 5 immediate release tablet 1 tablet by mouth every 4-6 hour as needed 7 tablet 0   • ibuprofen (MOTRIN) 600 mg tablet Take 1 tablet (600 mg total) by mouth every 6 (six) hours as needed for mild pain or moderate pain for up to 14 days 56 tablet 0     No current facility-administered medications for this visit.           Objective:    Blood pressure 132/82, pulse 75, temperature 98.6 °F (37 °C), height 5' 2\" (1.575 m), weight 74 kg (163 lb 3.2 oz), SpO2 98%.  Body mass index is 29.85 kg/m².  Body surface area is 1.75 meters squared.    Physical Exam  Constitutional:       Appearance: She is well-developed.   HENT:      Head: Normocephalic and atraumatic.   Neck:      Thyroid: No thyromegaly.   Cardiovascular:      Rate and Rhythm: Normal rate and regular rhythm.      Heart sounds: Normal heart sounds.   Pulmonary:      Effort: Pulmonary effort is normal.      Breath sounds: Normal breath sounds.   Abdominal:      General: Bowel sounds are normal.      Palpations: Abdomen is soft.      Comments: Well healed laparoscopic incisions.   Genitourinary:     Comments: -Normal external female genitalia, normal Bartholin's and [place]'s glands                  -Normal midline urethral meatus. No lesions notes                  -Bladder without fullness mass or tenderness                  -Vagina without lesion or discharge No significant cystocele or rectocele noted                  -Cervix surgically absent                  -Uterus surgically absent                  -Adnexae surgically absent                  - Anus without fissure of lesion    Musculoskeletal:         General: Normal range of motion.      Cervical back: Normal range of motion and neck supple.   Lymphadenopathy:      Cervical: No cervical adenopathy.   Skin:     " "General: Skin is warm and dry.   Neurological:      Mental Status: She is alert and oriented to person, place, and time.   Psychiatric:         Behavior: Behavior normal.         No results found for: \"\"  Lab Results   Component Value Date    K 3.7 06/14/2024     (H) 06/14/2024    CO2 26 06/14/2024    BUN 8 06/14/2024    CREATININE 0.56 (L) 06/14/2024    GLUF 103 (H) 06/14/2024    CALCIUM 7.8 (L) 06/14/2024    EGFR 117 06/14/2024     Lab Results   Component Value Date    WBC 9.78 06/14/2024    HGB 8.1 (L) 06/14/2024    HCT 26.9 (L) 06/14/2024    MCV 85 06/14/2024     06/14/2024     Lab Results   Component Value Date    NEUTROABS 7.76 (H) 06/14/2024        Trend:  No results found for: \"\"               "

## 2024-07-03 NOTE — ASSESSMENT & PLAN NOTE
Patient has a history of stage IV endometriosis status post robot hysterectomy BSO.  Surgery was challenging but uncomplicated.  Patient is healing well from this without complaint at this point.  We have recommended hormone replacement therapy but the patient is not interested.  Short of that we have recommended continuation of calcium and vitamin D as well as follow-up with her primary care physician to make sure cholesterol profile is appropriate.    Patient will follow-up on an as-needed basis.  Postsurgical restrictions were liberalized.

## 2024-07-03 NOTE — PROGRESS NOTES
Assessment/Plan:    Problem List Items Addressed This Visit       Bilateral ovarian cysts - Primary     Patient has a history of stage IV endometriosis status post robot hysterectomy BSO.  Surgery was challenging but uncomplicated.  Patient is healing well from this without complaint at this point.  We have recommended hormone replacement therapy but the patient is not interested.  Short of that we have recommended continuation of calcium and vitamin D as well as follow-up with her primary care physician to make sure cholesterol profile is appropriate.    Patient will follow-up on an as-needed basis.  Postsurgical restrictions were liberalized.              CHIEF COMPLAINT: Postoperative evaluation        Patient ID: Loli Esquivel is a 39 y.o. female  Patient presents today for postoperative evaluation status post robot-assisted total laparoscopic hysterectomy bilateral salpingo-oophorectomy for stage IV endometriosis.  Surgery was uncomplicated but difficult due to significant scarring.  Final pathology report reveals the following:  Final Diagnosis  A. Peritoneum, Bladder Flap biopsy:  - Foreign material compatible with surgical hemostatic material associated with     hemorrhage, siderosis, foreign body giant cell reaction, acute and chronic inflammation    and reactive fibrosis.   - Negative for malignancy.     B. Ovary, Right, biopsy:  - Foreign material compatible with surgical hemostatic material associated with     foreign body giant cell reaction and hemorrhage.  - Negative for malignancy.       C. Ovary, Right, oophorectomy:  - Portions of benign ovary with endometriotic cyst (endometrioma).  - Portions of foreign material compatible with surgical hemostatic material associated     with hemorrhage, siderosis, foreign body giant cell reaction, reactive fibrosis and chronic     inflammation.    - Benign fallopian tube with serosal endometriosis.   - Negative for malignancy.        D. Uterus (251 grams),  Cervix, B/L Fallopian Tubes, Left Ovary, hysterectomy with bilateral   salpingectomy and left oophorectomy:  Cervix  - Acute and chronic cervicitis with squamous metaplasia.  - Negative for squamous intraepithelial lesion and malignancy.  Endometrium  - Benign proliferative endometrium with tubal metaplasia.  - Surgical site changes.   - Negative for hyperplasia, malignancy and chronic endometritis.  Myometrium  - Intramural leiomyomas.    -- No significant cytologic atypia, no increased mitoses and no tumor cell necrosis.  - Focal adenomyosis.   Serosa  - Endometriosis.  - Serosal fibrous adhesions associated with hemorrhage, siderosis and foreign material     compatible with surgical hemostatic material.  Left Ovary and Fallopian Tube  - Endometriosis and endometriotic cyst(s)/endometrioma(s).   - Serosal fibrous adhesions associated with hemorrhage and endometriosis.   - Negative for malignancy.      Today, the patient is doing well.  She denies significant abdominal pain, pelvic pain, nausea, vomiting, constipation, diarrhea, fevers, chills, or vaginal bleeding.  Patient is not having any significant hot flashes.  She is taking calcium and vitamin D and is not interested in hormone replacement therapy.           The following portions of the patient's history were reviewed and updated as appropriate: allergies, current medications, past family history, past medical history, past social history, past surgical history, and problem list.    Review of Systems   Constitutional: Negative.    HENT: Negative.     Eyes: Negative.    Respiratory: Negative.     Cardiovascular: Negative.    Gastrointestinal: Negative.    Endocrine: Negative.    Genitourinary: Negative.    Musculoskeletal: Negative.    Skin: Negative.    Neurological: Negative.    Hematological: Negative.    Psychiatric/Behavioral: Negative.         Current Outpatient Medications   Medication Sig Dispense Refill    acetaminophen (TYLENOL) 325 mg tablet Take  "650 mg by mouth every 6 (six) hours as needed for mild pain      cholecalciferol (VITAMIN D3) 1,000 units tablet Take 1,000 Units by mouth daily      Multiple Vitamin (multivitamin) capsule Take 1 capsule by mouth daily      oxyCODONE (ROXICODONE) 5 immediate release tablet 1 tablet by mouth every 4-6 hour as needed 7 tablet 0    ibuprofen (MOTRIN) 600 mg tablet Take 1 tablet (600 mg total) by mouth every 6 (six) hours as needed for mild pain or moderate pain for up to 14 days 56 tablet 0     No current facility-administered medications for this visit.           Objective:    Blood pressure 132/82, pulse 75, temperature 98.6 °F (37 °C), height 5' 2\" (1.575 m), weight 74 kg (163 lb 3.2 oz), SpO2 98%.  Body mass index is 29.85 kg/m².  Body surface area is 1.75 meters squared.    Physical Exam  Constitutional:       Appearance: She is well-developed.   HENT:      Head: Normocephalic and atraumatic.   Neck:      Thyroid: No thyromegaly.   Cardiovascular:      Rate and Rhythm: Normal rate and regular rhythm.      Heart sounds: Normal heart sounds.   Pulmonary:      Effort: Pulmonary effort is normal.      Breath sounds: Normal breath sounds.   Abdominal:      General: Bowel sounds are normal.      Palpations: Abdomen is soft.      Comments: Well healed laparoscopic incisions.   Genitourinary:     Comments: -Normal external female genitalia, normal Bartholin's and [place]'s glands                  -Normal midline urethral meatus. No lesions notes                  -Bladder without fullness mass or tenderness                  -Vagina without lesion or discharge No significant cystocele or rectocele noted                  -Cervix surgically absent                  -Uterus surgically absent                  -Adnexae surgically absent                  - Anus without fissure of lesion    Musculoskeletal:         General: Normal range of motion.      Cervical back: Normal range of motion and neck supple.   Lymphadenopathy:      " "Cervical: No cervical adenopathy.   Skin:     General: Skin is warm and dry.   Neurological:      Mental Status: She is alert and oriented to person, place, and time.   Psychiatric:         Behavior: Behavior normal.         No results found for: \"\"  Lab Results   Component Value Date    K 3.7 06/14/2024     (H) 06/14/2024    CO2 26 06/14/2024    BUN 8 06/14/2024    CREATININE 0.56 (L) 06/14/2024    GLUF 103 (H) 06/14/2024    CALCIUM 7.8 (L) 06/14/2024    EGFR 117 06/14/2024     Lab Results   Component Value Date    WBC 9.78 06/14/2024    HGB 8.1 (L) 06/14/2024    HCT 26.9 (L) 06/14/2024    MCV 85 06/14/2024     06/14/2024     Lab Results   Component Value Date    NEUTROABS 7.76 (H) 06/14/2024        Trend:  No results found for: \"\"              "

## (undated) DEVICE — TRAY FOLEY 16FR URIMETER SILICONE SURESTEP

## (undated) DEVICE — DRAPE SHEET THREE QUARTER

## (undated) DEVICE — DECANTER: Brand: UNBRANDED

## (undated) DEVICE — PREMIUM DRY TRAY LF: Brand: MEDLINE INDUSTRIES, INC.

## (undated) DEVICE — TIP COVER ACCESSORY

## (undated) DEVICE — INTENDED FOR TISSUE SEPARATION, AND OTHER PROCEDURES THAT REQUIRE A SHARP SURGICAL BLADE TO PUNCTURE OR CUT.: Brand: BARD-PARKER SAFETY BLADES SIZE 11, STERILE

## (undated) DEVICE — PROGRASP FORCEPS: Brand: ENDOWRIST

## (undated) DEVICE — GLOVE PI ULTRA TOUCH SZ.7.5

## (undated) DEVICE — TOWEL SURG XR DETECT GREEN STRL RFD

## (undated) DEVICE — SPONGE 4 X 4 XRAY 16 PLY STRL LF RFD

## (undated) DEVICE — UTERINE MANIPULATOR RUMI 6.7 X 10 CM

## (undated) DEVICE — GLOVE INDICATOR PI UNDERGLOVE SZ 7.5 BLUE

## (undated) DEVICE — ENDOPATH PNEUMONEEDLE INSUFFLATION NEEDLES WITH LUER LOCK CONNECTORS 150MM: Brand: ENDOPATH

## (undated) DEVICE — VESSEL SEALER EXTEND: Brand: ENDOWRIST

## (undated) DEVICE — HEAVY DUTY TABLE COVER: Brand: CONVERTORS

## (undated) DEVICE — VISUALIZATION SYSTEM: Brand: CLEARIFY

## (undated) DEVICE — MEGA SUTURECUT ND: Brand: ENDOWRIST

## (undated) DEVICE — CANNULA SEAL

## (undated) DEVICE — GLOVE INDICATOR PI UNDERGLOVE SZ 7 BLUE

## (undated) DEVICE — MONOPOLAR CURVED SCISSORS: Brand: ENDOWRIST

## (undated) DEVICE — ASTOUND STANDARD SURGICAL GOWN, XL: Brand: CONVERTORS

## (undated) DEVICE — SUT MONOCRYL 4-0 PS-2 27 IN Y426H

## (undated) DEVICE — AIRSEAL TUBE SMOKE EVAC LUMENX3 FILTERED

## (undated) DEVICE — PACK TUR

## (undated) DEVICE — KIT, BETHLEHEM ROBOTIC PROST: Brand: CARDINAL HEALTH

## (undated) DEVICE — COLUMN DRAPE

## (undated) DEVICE — ARM DRAPE

## (undated) DEVICE — BLADELESS OBTURATOR: Brand: WECK VISTA

## (undated) DEVICE — EXOFIN PRECISION PEN HIGH VISCOSITY TOPICAL SKIN ADHESIVE: Brand: EXOFIN PRECISION PEN, 1G

## (undated) DEVICE — 40595 XL TRENDELENBURG POSITIONING KIT: Brand: 40595 XL TRENDELENBURG POSITIONING KIT

## (undated) DEVICE — CHLORHEXIDINE 4PCT 4 OZ

## (undated) DEVICE — SUT STRATAFIX SPIRAL 2-0 CT-1 30 CM SXPP1B410